# Patient Record
Sex: MALE | Race: WHITE | NOT HISPANIC OR LATINO | Employment: FULL TIME | URBAN - METROPOLITAN AREA
[De-identification: names, ages, dates, MRNs, and addresses within clinical notes are randomized per-mention and may not be internally consistent; named-entity substitution may affect disease eponyms.]

---

## 2017-07-31 ENCOUNTER — APPOINTMENT (EMERGENCY)
Dept: RADIOLOGY | Facility: HOSPITAL | Age: 27
End: 2017-07-31
Payer: COMMERCIAL

## 2017-07-31 ENCOUNTER — HOSPITAL ENCOUNTER (EMERGENCY)
Facility: HOSPITAL | Age: 27
Discharge: HOME | End: 2017-08-01
Attending: EMERGENCY MEDICINE
Payer: COMMERCIAL

## 2017-07-31 VITALS
OXYGEN SATURATION: 98 % | HEART RATE: 100 BPM | DIASTOLIC BLOOD PRESSURE: 84 MMHG | BODY MASS INDEX: 19.23 KG/M2 | TEMPERATURE: 98.1 F | RESPIRATION RATE: 18 BRPM | SYSTOLIC BLOOD PRESSURE: 135 MMHG | WEIGHT: 142 LBS | HEIGHT: 72 IN

## 2017-07-31 DIAGNOSIS — S81.012A LACERATION OF SKIN OF LEFT KNEE, INITIAL ENCOUNTER: Primary | ICD-10-CM

## 2017-07-31 PROCEDURE — 73560 X-RAY EXAM OF KNEE 1 OR 2: CPT

## 2017-07-31 RX ORDER — LIDOCAINE HYDROCHLORIDE AND EPINEPHRINE 10; 10 MG/ML; UG/ML
10 INJECTION, SOLUTION INFILTRATION; PERINEURAL ONCE
Status: DISCONTINUED | OUTPATIENT
Start: 2017-07-31 | End: 2017-08-01 | Stop reason: HOSPADM

## 2017-08-01 PROCEDURE — 99283 EMERGENCY DEPT VISIT LOW MDM: CPT

## 2021-05-29 ENCOUNTER — OFFICE VISIT (OUTPATIENT)
Dept: URGENT CARE | Facility: CLINIC | Age: 31
End: 2021-05-29
Payer: COMMERCIAL

## 2021-05-29 VITALS
BODY MASS INDEX: 20.75 KG/M2 | SYSTOLIC BLOOD PRESSURE: 154 MMHG | OXYGEN SATURATION: 99 % | RESPIRATION RATE: 20 BRPM | WEIGHT: 153 LBS | HEART RATE: 69 BPM | TEMPERATURE: 98 F | DIASTOLIC BLOOD PRESSURE: 78 MMHG

## 2021-05-29 DIAGNOSIS — R07.0 THROAT PAIN: Primary | ICD-10-CM

## 2021-05-29 PROCEDURE — G0382 LEV 3 HOSP TYPE B ED VISIT: HCPCS | Performed by: PHYSICIAN ASSISTANT

## 2021-05-29 PROCEDURE — S9083 URGENT CARE CENTER GLOBAL: HCPCS | Performed by: PHYSICIAN ASSISTANT

## 2021-05-29 RX ORDER — FAMOTIDINE 20 MG/1
20 TABLET, FILM COATED ORAL 2 TIMES DAILY
Qty: 60 TABLET | Refills: 0 | Status: SHIPPED | OUTPATIENT
Start: 2021-05-29

## 2021-05-29 NOTE — PROGRESS NOTES
[address]        NAME: Vic Medrano is a 36 y o  male  : 1990    MRN: [id number]  DATE: May 29, 2021  TIME: 2:16 PM    Assessment and Plan   Throat pain [R07 0]  1  Throat pain  Ambulatory Referral to Otolaryngology    famotidine (PEPCID) 20 mg tablet         Patient Instructions     Patient Instructions     Possible reflux with some irritation in the throat  No lymph nodes appreciated or thyroid   Mass  Can start Christina reflux medicines such as famotidine  May benefit from ENT evaluation  Discussed tobacco cessation  Follow up with PCP in 3-5 days  Proceed to  ER if symptoms worsen  Chief Complaint     Chief Complaint   Patient presents with    Neck Pain     both sides no injury , no OTC meds taken x few days         History of Present Illness         82-year-old male complains of throat and anterior neck pain for last several days  It is progressing and now has pain more throughout the day  Started after eating and when lying down at night  No regurgitation but does get some heartburn and reflux  No painful swallowing or trouble swallowing  No cough or trouble breathing  He does use smokeless tobacco about half can per day  No fever  Review of Systems   Review of Systems   Constitutional: Negative  HENT: Negative  Respiratory: Negative  Cardiovascular: Negative  Gastrointestinal: Negative  Musculoskeletal: Positive for neck pain  Negative for neck stiffness           Current Medications       Current Outpatient Medications:     famotidine (PEPCID) 20 mg tablet, Take 1 tablet (20 mg total) by mouth 2 (two) times a day, Disp: 60 tablet, Rfl: 0    Current Allergies     Allergies as of 2021    (No Known Allergies)            The following portions of the patient's history were reviewed and updated as appropriate: allergies, current medications, past family history, past medical history, past social history, past surgical history and problem list  Past Medical History:   Diagnosis Date    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[address] )     Renal disorder        Past Surgical History:   Procedure Laterality Date    KIDNEY SURGERY      SHOULDER SURGERY         History reviewed  No pertinent family history  Medications have been verified  Objective   /78   Pulse 69   Temp 98 °F (36 7 °C)   Resp 20   Wt 69 4 kg (153 lb)   SpO2 99%   BMI 20 75 kg/m²        Physical Exam     Physical Exam  Constitutional:       General: He is not in acute distress  Appearance: He is well-developed  HENT:      Head: Normocephalic and atraumatic  Right Ear: Tympanic membrane, ear canal and external ear normal  No middle ear effusion  Tympanic membrane is not erythematous, retracted or bulging  Left Ear: Tympanic membrane, ear canal and external ear normal   No middle ear effusion  Tympanic membrane is not erythematous, retracted or bulging  Nose: Nose normal  No mucosal edema or rhinorrhea  Right Sinus: No maxillary sinus tenderness or frontal sinus tenderness  Left Sinus: No maxillary sinus tenderness or frontal sinus tenderness  Mouth/Throat:      Pharynx: No posterior oropharyngeal erythema  Eyes:      General:         Right eye: No discharge  Left eye: No discharge  Conjunctiva/sclera: Conjunctivae normal       Right eye: Right conjunctiva is not injected  No chemosis  Left eye: Left conjunctiva is not injected  No chemosis  Pupils: Pupils are equal, round, and reactive to light  Neck:      Musculoskeletal: Normal range of motion and neck supple  Thyroid: No thyroid mass  Cardiovascular:      Rate and Rhythm: Normal rate and regular rhythm  Heart sounds: Normal heart sounds  Pulmonary:      Effort: Pulmonary effort is normal  No respiratory distress  Breath sounds: Normal breath sounds  No wheezing or rales  Abdominal:      General: Abdomen is flat   Bowel sounds are normal       Palpations: Abdomen is soft  Tenderness: There is no abdominal tenderness  There is no right CVA tenderness, left CVA tenderness, guarding or rebound  Negative signs include Warner's sign  Musculoskeletal:      Comments:   C-spine full range of motion  Some pain with extension  Nontender over the bilateral sternocleidomastoid  Nontender over bilateral sternoclavicular joints  Lymphadenopathy:      Cervical: No cervical adenopathy  Right cervical: No superficial cervical adenopathy  Left cervical: No superficial cervical adenopathy  Skin:     General: Skin is warm and dry  Findings: No rash  Neurological:      Mental Status: He is alert and oriented to person, place, and time  Cranial Nerves: No cranial nerve deficit

## 2021-05-29 NOTE — PATIENT INSTRUCTIONS
Possible reflux with some irritation in the throat  No lymph nodes appreciated or thyroid   Mass  Can start Christina reflux medicines such as famotidine  May benefit from ENT evaluation  Discussed tobacco cessation

## 2021-11-23 ENCOUNTER — HOSPITAL ENCOUNTER (EMERGENCY)
Facility: HOSPITAL | Age: 31
Discharge: HOME | End: 2021-11-23
Attending: EMERGENCY MEDICINE | Admitting: EMERGENCY MEDICINE
Payer: COMMERCIAL

## 2021-11-23 ENCOUNTER — APPOINTMENT (EMERGENCY)
Dept: ULTRASOUND IMAGING | Facility: HOSPITAL | Age: 31
End: 2021-11-23
Payer: COMMERCIAL

## 2021-11-23 VITALS
TEMPERATURE: 97.9 F | HEART RATE: 112 BPM | DIASTOLIC BLOOD PRESSURE: 92 MMHG | RESPIRATION RATE: 18 BRPM | OXYGEN SATURATION: 95 % | SYSTOLIC BLOOD PRESSURE: 136 MMHG

## 2021-11-23 DIAGNOSIS — N43.3 LEFT HYDROCELE: Primary | ICD-10-CM

## 2021-11-23 LAB
BILIRUB UR QL STRIP: NEGATIVE
CLARITY UR: CLEAR
COLOR UR: YELLOW
GLUCOSE UR STRIP-MCNC: NEGATIVE MG/DL
HGB UR QL STRIP.AUTO: NEGATIVE
KETONES UR STRIP-MCNC: ABNORMAL MG/DL
LEUKOCYTE ESTERASE UR QL STRIP: NEGATIVE
NITRITE UR QL STRIP: NEGATIVE
PH UR STRIP.AUTO: 6 [PH]
PROT UR STRIP-MCNC: NEGATIVE MG/DL
SP GR UR STRIP.AUTO: 1.02 (ref 1–1.03)
UROBILINOGEN UR QL STRIP.AUTO: 0.2 E.U./DL

## 2021-11-23 PROCEDURE — 99284 EMERGENCY DEPT VISIT MOD MDM: CPT

## 2021-11-23 PROCEDURE — 76870 US EXAM SCROTUM: CPT

## 2021-11-23 PROCEDURE — 81003 URINALYSIS AUTO W/O SCOPE: CPT | Performed by: EMERGENCY MEDICINE

## 2021-11-23 PROCEDURE — 99282 EMERGENCY DEPT VISIT SF MDM: CPT | Performed by: EMERGENCY MEDICINE

## 2021-11-24 ENCOUNTER — TELEPHONE (OUTPATIENT)
Dept: UROLOGY | Facility: AMBULATORY SURGERY CENTER | Age: 31
End: 2021-11-24

## 2021-11-30 ENCOUNTER — OFFICE VISIT (OUTPATIENT)
Dept: UROLOGY | Facility: CLINIC | Age: 31
End: 2021-11-30
Payer: COMMERCIAL

## 2021-11-30 VITALS — HEIGHT: 72 IN | BODY MASS INDEX: 20.99 KG/M2 | HEART RATE: 94 BPM | WEIGHT: 155 LBS

## 2021-11-30 DIAGNOSIS — D30.02: ICD-10-CM

## 2021-11-30 DIAGNOSIS — C64.2 MALIGNANT NEOPLASM OF LEFT KIDNEY (HCC): Primary | ICD-10-CM

## 2021-11-30 PROCEDURE — 99203 OFFICE O/P NEW LOW 30 MIN: CPT | Performed by: PHYSICIAN ASSISTANT

## 2021-12-02 ENCOUNTER — APPOINTMENT (OUTPATIENT)
Dept: LAB | Facility: MEDICAL CENTER | Age: 31
End: 2021-12-02
Payer: COMMERCIAL

## 2021-12-02 DIAGNOSIS — C64.2 MALIGNANT NEOPLASM OF LEFT KIDNEY (HCC): ICD-10-CM

## 2021-12-02 LAB
AFP-TM SERPL-MCNC: 4.5 NG/ML (ref 0.5–8)
ALBUMIN SERPL BCP-MCNC: 4.1 G/DL (ref 3.5–5)
ALP SERPL-CCNC: 78 U/L (ref 46–116)
ALT SERPL W P-5'-P-CCNC: 36 U/L (ref 12–78)
ANION GAP SERPL CALCULATED.3IONS-SCNC: 7 MMOL/L (ref 4–13)
AST SERPL W P-5'-P-CCNC: 18 U/L (ref 5–45)
BILIRUB SERPL-MCNC: 0.82 MG/DL (ref 0.2–1)
BUN SERPL-MCNC: 11 MG/DL (ref 5–25)
CALCIUM SERPL-MCNC: 10.1 MG/DL (ref 8.3–10.1)
CHLORIDE SERPL-SCNC: 107 MMOL/L (ref 100–108)
CO2 SERPL-SCNC: 24 MMOL/L (ref 21–32)
CREAT SERPL-MCNC: 1.26 MG/DL (ref 0.6–1.3)
ERYTHROCYTE [DISTWIDTH] IN BLOOD BY AUTOMATED COUNT: 12.6 % (ref 11.6–15.1)
GFR SERPL CREATININE-BSD FRML MDRD: 77 ML/MIN/1.73SQ M
GLUCOSE SERPL-MCNC: 94 MG/DL (ref 65–140)
HCG-TM SERPL-SCNC: <2 MLU/ML
HCT VFR BLD AUTO: 44.5 % (ref 36.5–49.3)
HGB BLD-MCNC: 15.8 G/DL (ref 12–17)
MCH RBC QN AUTO: 31.8 PG (ref 26.8–34.3)
MCHC RBC AUTO-ENTMCNC: 35.5 G/DL (ref 31.4–37.4)
MCV RBC AUTO: 90 FL (ref 82–98)
PLATELET # BLD AUTO: 304 THOUSANDS/UL (ref 149–390)
PMV BLD AUTO: 10 FL (ref 8.9–12.7)
POTASSIUM SERPL-SCNC: 3.5 MMOL/L (ref 3.5–5.3)
PROT SERPL-MCNC: 7.8 G/DL (ref 6.4–8.2)
RBC # BLD AUTO: 4.97 MILLION/UL (ref 3.88–5.62)
SODIUM SERPL-SCNC: 138 MMOL/L (ref 136–145)
WBC # BLD AUTO: 9.56 THOUSAND/UL (ref 4.31–10.16)

## 2021-12-02 PROCEDURE — 83625 ASSAY OF LDH ENZYMES: CPT

## 2021-12-02 PROCEDURE — 82105 ALPHA-FETOPROTEIN SERUM: CPT

## 2021-12-02 PROCEDURE — 84702 CHORIONIC GONADOTROPIN TEST: CPT

## 2021-12-02 PROCEDURE — 83615 LACTATE (LD) (LDH) ENZYME: CPT

## 2021-12-02 PROCEDURE — 36415 COLL VENOUS BLD VENIPUNCTURE: CPT

## 2021-12-02 PROCEDURE — 80053 COMPREHEN METABOLIC PANEL: CPT

## 2021-12-02 PROCEDURE — 85027 COMPLETE CBC AUTOMATED: CPT

## 2021-12-06 LAB
LDH SERPL-CCNC: 142 IU/L (ref 121–224)
LDH1 CFR SERPL ELPH: 30 % (ref 17–32)
LDH2 CFR SERPL ELPH: 33 % (ref 25–40)
LDH3 CFR SERPL ELPH: 19 % (ref 17–27)
LDH4 CFR SERPL ELPH: 8 % (ref 5–13)
LDH5 CFR SERPL ELPH: 10 % (ref 4–20)

## 2021-12-22 ENCOUNTER — TELEPHONE (OUTPATIENT)
Dept: UROLOGY | Facility: AMBULATORY SURGERY CENTER | Age: 31
End: 2021-12-22

## 2021-12-22 DIAGNOSIS — C64.2 MALIGNANT NEOPLASM OF LEFT KIDNEY (HCC): Primary | ICD-10-CM

## 2021-12-22 NOTE — TELEPHONE ENCOUNTER
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

## 2021-12-22 NOTE — TELEPHONE ENCOUNTER
Please see message below for new orders please call patient and schedule and cancel his CT scan which is not approved    Thanks  Poornima Pearson

## 2021-12-22 NOTE — TELEPHONE ENCOUNTER
LMOM advising of previous notes  Provided patient with CS contact number, also mailed patient both US & XR orders

## 2021-12-22 NOTE — TELEPHONE ENCOUNTER
It appears Christian already ordered on ultrasound and a chest x-ray if you could please assist in scheduling for this patient  The CT scan chest abdomen pelvis was denied by the insurance

## 2021-12-22 NOTE — TELEPHONE ENCOUNTER
A peer to peer was already done with Miki Keith please see message below  Let [facility] know    Thanks  Aryan Faria

## 2021-12-22 NOTE — TELEPHONE ENCOUNTER
Ken Burk  This patients CT scans are denied with the insurance  Please call [phone]   To try and do a peer to peer case# [id number]  Let me know the outcome    Thanks  Judd Daniel

## 2022-01-04 ENCOUNTER — TELEPHONE (OUTPATIENT)
Dept: UROLOGY | Facility: CLINIC | Age: 32
End: 2022-01-04

## 2022-01-04 NOTE — TELEPHONE ENCOUNTER
----- Message from Keyonna Downing PA-C sent at 1/4/2022  4:04 PM EST -----   Does not appear he has had any of his testing so maybe he can be rescheduled until that is completed

## 2022-01-04 NOTE — TELEPHONE ENCOUNTER
Spoke with patient to advise him we will need to reschedule his appt due to imaging was not performed  Patient states his insurance denied CT scan  An order for US and chest xray were placed instead, but according to patient, insurance denied those tests also  Patient did have labs performed and would like to reschedule as he is still having same issues  Patient also reports he was going to have to cancel his appt 1/6/22 anyway as he tested positive for COVID yesterday  As of right now, rescheduled his appt for 1/11/22, and advised patient if he is still symptomatic at that time, he will need to reschedule  Patient verbalized understanding and agrees to plan

## 2022-02-28 ENCOUNTER — HOSPITAL ENCOUNTER (EMERGENCY)
Facility: HOSPITAL | Age: 32
Discharge: HOME | End: 2022-02-28
Attending: EMERGENCY MEDICINE | Admitting: EMERGENCY MEDICINE
Payer: COMMERCIAL

## 2022-02-28 ENCOUNTER — APPOINTMENT (EMERGENCY)
Dept: CT IMAGING | Facility: HOSPITAL | Age: 32
End: 2022-02-28
Payer: COMMERCIAL

## 2022-02-28 VITALS
OXYGEN SATURATION: 100 % | HEART RATE: 83 BPM | SYSTOLIC BLOOD PRESSURE: 152 MMHG | DIASTOLIC BLOOD PRESSURE: 76 MMHG | RESPIRATION RATE: 18 BRPM | TEMPERATURE: 97.7 F

## 2022-02-28 DIAGNOSIS — T14.8XXA CONTUSION: Primary | ICD-10-CM

## 2022-02-28 LAB
ALBUMIN SERPL BCP-MCNC: 3.7 G/DL (ref 3.5–5)
ALP SERPL-CCNC: 80 U/L (ref 46–116)
ALT SERPL W P-5'-P-CCNC: 47 U/L (ref 12–78)
ANION GAP SERPL CALCULATED.3IONS-SCNC: 10 MMOL/L (ref 4–13)
AST SERPL W P-5'-P-CCNC: 32 U/L (ref 5–45)
BASOPHILS # BLD AUTO: 0.03 THOUSANDS/ΜL (ref 0–0.1)
BASOPHILS NFR BLD AUTO: 0 % (ref 0–1)
BILIRUB SERPL-MCNC: 0.79 MG/DL (ref 0.2–1)
BILIRUB UR QL STRIP: NEGATIVE
BUN SERPL-MCNC: 7 MG/DL (ref 5–25)
CALCIUM SERPL-MCNC: 9.3 MG/DL (ref 8.3–10.1)
CHLORIDE SERPL-SCNC: 102 MMOL/L (ref 100–108)
CLARITY UR: CLEAR
CO2 SERPL-SCNC: 26 MMOL/L (ref 21–32)
COLOR UR: YELLOW
CREAT SERPL-MCNC: 1.03 MG/DL (ref 0.6–1.3)
EOSINOPHIL # BLD AUTO: 0.05 THOUSAND/ΜL (ref 0–0.61)
EOSINOPHIL NFR BLD AUTO: 1 % (ref 0–6)
ERYTHROCYTE [DISTWIDTH] IN BLOOD BY AUTOMATED COUNT: 12.3 % (ref 11.6–15.1)
GFR SERPL CREATININE-BSD FRML MDRD: 97 ML/MIN/1.73SQ M
GLUCOSE SERPL-MCNC: 104 MG/DL (ref 65–140)
GLUCOSE UR STRIP-MCNC: NEGATIVE MG/DL
HCT VFR BLD AUTO: 43.2 % (ref 36.5–49.3)
HGB BLD-MCNC: 15.9 G/DL (ref 12–17)
HGB UR QL STRIP.AUTO: NEGATIVE
IMM GRANULOCYTES # BLD AUTO: 0.02 THOUSAND/UL (ref 0–0.2)
IMM GRANULOCYTES NFR BLD AUTO: 0 % (ref 0–2)
KETONES UR STRIP-MCNC: NEGATIVE MG/DL
LEUKOCYTE ESTERASE UR QL STRIP: NEGATIVE
LIPASE SERPL-CCNC: 120 U/L (ref 73–393)
LYMPHOCYTES # BLD AUTO: 1.32 THOUSANDS/ΜL (ref 0.6–4.47)
LYMPHOCYTES NFR BLD AUTO: 13 % (ref 14–44)
MCH RBC QN AUTO: 32.9 PG (ref 26.8–34.3)
MCHC RBC AUTO-ENTMCNC: 36.8 G/DL (ref 31.4–37.4)
MCV RBC AUTO: 89 FL (ref 82–98)
MONOCYTES # BLD AUTO: 0.98 THOUSAND/ΜL (ref 0.17–1.22)
MONOCYTES NFR BLD AUTO: 9 % (ref 4–12)
NEUTROPHILS # BLD AUTO: 8.03 THOUSANDS/ΜL (ref 1.85–7.62)
NEUTS SEG NFR BLD AUTO: 77 % (ref 43–75)
NITRITE UR QL STRIP: NEGATIVE
NRBC BLD AUTO-RTO: 0 /100 WBCS
PH UR STRIP.AUTO: 6 [PH] (ref 4.5–8)
PLATELET # BLD AUTO: 318 THOUSANDS/UL (ref 149–390)
PMV BLD AUTO: 9.1 FL (ref 8.9–12.7)
POTASSIUM SERPL-SCNC: 4 MMOL/L (ref 3.5–5.3)
PROT SERPL-MCNC: 7.3 G/DL (ref 6.4–8.2)
PROT UR STRIP-MCNC: NEGATIVE MG/DL
RBC # BLD AUTO: 4.83 MILLION/UL (ref 3.88–5.62)
SODIUM SERPL-SCNC: 138 MMOL/L (ref 136–145)
SP GR UR STRIP.AUTO: 1.01 (ref 1–1.03)
UROBILINOGEN UR QL STRIP.AUTO: 0.2 E.U./DL
WBC # BLD AUTO: 10.43 THOUSAND/UL (ref 4.31–10.16)

## 2022-02-28 PROCEDURE — 36415 COLL VENOUS BLD VENIPUNCTURE: CPT | Performed by: PHYSICIAN ASSISTANT

## 2022-02-28 PROCEDURE — 85025 COMPLETE CBC W/AUTO DIFF WBC: CPT | Performed by: PHYSICIAN ASSISTANT

## 2022-02-28 PROCEDURE — 80053 COMPREHEN METABOLIC PANEL: CPT | Performed by: PHYSICIAN ASSISTANT

## 2022-02-28 PROCEDURE — 83690 ASSAY OF LIPASE: CPT | Performed by: PHYSICIAN ASSISTANT

## 2022-02-28 PROCEDURE — G1004 CDSM NDSC: HCPCS

## 2022-02-28 PROCEDURE — 99284 EMERGENCY DEPT VISIT MOD MDM: CPT | Performed by: PHYSICIAN ASSISTANT

## 2022-02-28 PROCEDURE — 74177 CT ABD & PELVIS W/CONTRAST: CPT

## 2022-02-28 PROCEDURE — 99284 EMERGENCY DEPT VISIT MOD MDM: CPT

## 2022-02-28 PROCEDURE — 81003 URINALYSIS AUTO W/O SCOPE: CPT

## 2022-02-28 RX ADMIN — IOHEXOL 100 ML: 350 INJECTION, SOLUTION INTRAVENOUS at 10:27

## 2022-02-28 NOTE — ED PROVIDER NOTES
History  Chief Complaint   Patient presents with    Flank Pain     Pt reports woke up this AM with bruising to left flank  No injury  Pt having pain to left flank  pt denies n/v/d  Pt denies shortness of breath  The patient is a 51-year-old male with history of germ cell tumors who presents to the emergency department for evaluation of left-sided flank pain and bruising  The patient states he woke this morning, and he noted some discomfort in his left flank/left lateral abdomen area  He states he looked in the mirror, and he noted a large bruise to the area  He adamantly denies any trauma  He states he does not believe the bruise with there last night before he went to sleep  He reports tenderness he really over the area of the bruising  He denies any other abdominal or back pain  He did not take anything for pain today  There is no associated nausea, vomiting or diarrhea  He additionally denies fever, chills, chest pain or shortness of breath  History provided by:  Patient   used: No    Flank Pain  Associated symptoms: no chest pain, no chills, no cough, no diarrhea, no dysuria, no fever, no hematuria, no nausea, no shortness of breath, no sore throat and no vomiting        Prior to Admission Medications   Prescriptions Last Dose Informant Patient Reported? Taking?   famotidine (PEPCID) 20 mg tablet  Self No No   Sig: Take 1 tablet (20 mg total) by mouth 2 (two) times a day   omeprazole (PriLOSEC) 20 mg delayed release capsule  Self No No   Sig: Take 1 capsule (20 mg total) by mouth daily      Facility-Administered Medications: None       Past Medical History:   Diagnosis Date    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 [address] )     Renal disorder        Past Surgical History:   Procedure Laterality Date    KIDNEY SURGERY      SHOULDER SURGERY         History reviewed  No pertinent family history  I have reviewed and agree with the history as documented      E-Cigarette/Vaping    E-Cigarette Use Never User      E-Cigarette/Vaping Substances     Social History     Tobacco Use    Smoking status: Never Smoker    Smokeless tobacco: Current User     Types: Chew   Vaping Use    Vaping Use: Never used   Substance Use Topics    Alcohol use: Yes    Drug use: No       Review of Systems   Constitutional: Negative for chills and fever  HENT: Negative for ear pain and sore throat  Eyes: Negative for redness and visual disturbance  Respiratory: Negative for cough, shortness of breath and wheezing  Cardiovascular: Negative for chest pain  Gastrointestinal: Negative for abdominal pain, diarrhea, nausea and vomiting  Genitourinary: Positive for flank pain  Negative for dysuria and hematuria  Musculoskeletal: Negative for back pain, neck pain and neck stiffness  Skin: Positive for color change (Bruising)  Negative for rash  Neurological: Negative for dizziness, light-headedness and headaches  All other systems reviewed and are negative  Physical Exam  Physical Exam  Vitals and nursing note reviewed  Constitutional:       General: He is not in acute distress  Appearance: He is well-developed  He is not ill-appearing or toxic-appearing  HENT:      Head: Normocephalic and atraumatic  Eyes:      Pupils: Pupils are equal, round, and reactive to light  Cardiovascular:      Rate and Rhythm: Normal rate and regular rhythm  Heart sounds: Normal heart sounds  Pulmonary:      Effort: Pulmonary effort is normal       Breath sounds: Normal breath sounds  Abdominal:      General: There is no distension  Palpations: Abdomen is soft  Tenderness: There is no guarding or rebound  Musculoskeletal:      Cervical back: Normal range of motion and neck supple  Skin:     General: Skin is warm and dry  Neurological:      Mental Status: He is alert and oriented to person, place, and time           Vital Signs  ED Triage Vitals [02/28/22 0851]   Temperature Pulse Respirations Blood Pressure SpO2   97 7 °F (36 5 °C) 95 18 (!) 182/99 98 %      Temp Source Heart Rate Source Patient Position - Orthostatic VS BP Location FiO2 (%)   Oral Monitor Lying Right arm --      Pain Score       7           Vitals:    02/28/22 0851 02/28/22 1103   BP: (!) 182/99 152/76   Pulse: 95 83   Patient Position - Orthostatic VS: Lying Sitting         Visual Acuity      ED Medications  Medications   iohexol (OMNIPAQUE) 350 MG/ML injection (SINGLE-DOSE) 100 mL (100 mL Intravenous Given 2/28/22 1027)       Diagnostic Studies  Results Reviewed     Procedure Component Value Units Date/Time    Lipase [[id number]]  (Normal) Collected: 02/28/22 0919    Lab Status: Final result Specimen: Blood from Arm, Right Updated: 02/28/22 1001     Lipase 120 u/L     Comprehensive metabolic panel [[id number]] Collected: 02/28/22 0919    Lab Status: Final result Specimen: Blood from Arm, Right Updated: 02/28/22 1001     Sodium 138 mmol/L      Potassium 4 0 mmol/L      Chloride 102 mmol/L      CO2 26 mmol/L      ANION GAP 10 mmol/L      BUN 7 mg/dL      Creatinine 1 03 mg/dL      Glucose 104 mg/dL      Calcium 9 3 mg/dL      AST 32 U/L      ALT 47 U/L      Alkaline Phosphatase 80 U/L      Total Protein 7 3 g/dL      Albumin 3 7 g/dL      Total Bilirubin 0 79 mg/dL      eGFR 97 ml/min/1 73sq m     Narrative:      [place] guidelines for Chronic Kidney Disease (CKD):     Stage 1 with normal or high GFR (GFR > 90 mL/min/1 73 square meters)    Stage 2 Mild CKD (GFR = 60-89 mL/min/1 73 square meters)    Stage 3A Moderate CKD (GFR = 45-59 mL/min/1 73 square meters)    Stage 3B Moderate CKD (GFR = 30-44 mL/min/1 73 square meters)    Stage 4 Severe CKD (GFR = 15-29 mL/min/1 73 square meters)    Stage 5 End Stage CKD (GFR <15 mL/min/1 73 square meters)  Note: GFR calculation is accurate only with a steady state creatinine    CBC and differential [[id number]]  (Abnormal) Collected: 02/28/22 0919    Lab Status: Final result Specimen: Blood from Arm, Right Updated: 02/28/22 0938     WBC 10 43 Thousand/uL      RBC 4 83 Million/uL      Hemoglobin 15 9 g/dL      Hematocrit 43 2 %      MCV 89 fL      MCH 32 9 pg      MCHC 36 8 g/dL      RDW 12 3 %      MPV 9 1 fL      Platelets 645 Thousands/uL      nRBC 0 /100 WBCs      Neutrophils Relative 77 %      Immat GRANS % 0 %      Lymphocytes Relative 13 %      Monocytes Relative 9 %      Eosinophils Relative 1 %      Basophils Relative 0 %      Neutrophils Absolute 8 03 Thousands/µL      Immature Grans Absolute 0 02 Thousand/uL      Lymphocytes Absolute 1 32 Thousands/µL      Monocytes Absolute 0 98 Thousand/µL      Eosinophils Absolute 0 05 Thousand/µL      Basophils Absolute 0 03 Thousands/µL     Urine Macroscopic, POC [[id number]] Collected: 02/28/22 7335    Lab Status: Final result Specimen: Urine Updated: 02/28/22 0924     Color, UA Yellow     Clarity, UA Clear     pH, UA 6 0     Leukocytes, UA Negative     Nitrite, UA Negative     Protein, UA Negative mg/dl      Glucose, UA Negative mg/dl      Ketones, UA Negative mg/dl      Urobilinogen, UA 0 2 E U /dl      Bilirubin, UA Negative     Blood, UA Negative     Specific Gravity, UA 1 015    Narrative:      [facility] RESULT                 CT abdomen pelvis with contrast   Final Result by Virginia Desai MD (02/28 1101)      Left lateral body wall contusion  No traumatic visceral injury in the abdomen or pelvis  Expected surgical changes of left partial nephrectomy and retroperitoneal lymph node dissection  No evidence for recurrent or metastatic tumor in the abdomen or pelvis  Hydrocele in the left hemiscrotum, incompletely evaluated  Workstation performed: [id number]                    Procedures  Procedures         ED Course  ED Course as of 02/28/22 1604   Mon Feb 28, 2022   1035 Patient is resting comfortably in continues to deny anything for pain  All labs reviewed with no significant findings  Pending CT results  MDM  Number of Diagnoses or Management Options  Contusion: new and requires workup  Diagnosis management comments: Patient presents for evaluation of left-sided flank pain and bruising that started this morning  Patient denies any injury  Differential includes but is not limited to contusion versus hematoma versus grey hathaway's sign    Tenderness is concentrated over area of bruising  However, as the bruising is reportedly atraumatic, will do further workup at this time  Patient is agreeable  Labs and imaging ordered  Patient was offered something for pain, but he declined  Labs reviewed with no significant findings  Imaging shows contusion of subcutaneous tissue but no other indication of intra-abdominal injury  At this time, I do not have an etiology for bruising, although I suspect there was trauma that the patient was unaware of  I advised that he keep an eye on the area, and that he get re-evaluated if area of ecchymosis spreads or he starts having other frequent bruising or pain  He acknowledged understanding  I recommended follow-up with his primary care doctor  Patient is stable for discharge         Amount and/or Complexity of Data Reviewed  Clinical lab tests: ordered and reviewed  Tests in the radiology section of CPT®: ordered and reviewed  Decide to obtain previous medical records or to obtain history from someone other than the patient: yes  Review and summarize past medical records: yes    Risk of Complications, Morbidity, and/or Mortality  Presenting problems: low  Diagnostic procedures: low  Management options: low    Patient Progress  Patient progress: stable      Disposition  Final diagnoses:   Contusion     Time reflects when diagnosis was documented in both MDM as applicable and the Disposition within this note     Time User Action Codes Description Comment    2/28/2022 11:24 AM Radha Stephenson [T14 8XXA] Contusion       ED Disposition     ED Disposition Condition Date/Time Comment    Discharge Stable Mon Feb 28, 2022 11:24 AM Daniel Nolasco discharge to home/self care  Follow-up Information     Follow up With Specialties Details Why Contact Info Additional Information    [facility] Schedule an appointment as soon as possible for a visit   [address] [place] [address] [address] [id number] [address] [facility] [address] Emergency Department Emergency Medicine  If symptoms worsen [address] Emergency Department, [address], [place], [place], [address]          Discharge Medication List as of 2/28/2022 11:25 AM      CONTINUE these medications which have NOT CHANGED    Details   famotidine (PEPCID) 20 mg tablet Take 1 tablet (20 mg total) by mouth 2 (two) times a day, Starting Sat 5/29/2021, Normal      omeprazole (PriLOSEC) 20 mg delayed release capsule Take 1 capsule (20 mg total) by mouth daily, Starting Fri 6/4/2021, Normal             No discharge procedures on file      PDMP Review     None          ED Provider  Electronically Signed by           Yovany Tolbert PA-C  02/28/22 8912

## 2022-05-08 ENCOUNTER — HOSPITAL ENCOUNTER (EMERGENCY)
Facility: HOSPITAL | Age: 32
Discharge: HOME | End: 2022-05-08
Attending: EMERGENCY MEDICINE
Payer: COMMERCIAL

## 2022-05-08 VITALS
DIASTOLIC BLOOD PRESSURE: 76 MMHG | HEART RATE: 116 BPM | OXYGEN SATURATION: 95 % | SYSTOLIC BLOOD PRESSURE: 136 MMHG | TEMPERATURE: 98.2 F | RESPIRATION RATE: 20 BRPM

## 2022-05-08 DIAGNOSIS — F10.129 ALCOHOL INTOXICATION IN ACTIVE ALCOHOLIC (HCC): Primary | ICD-10-CM

## 2022-05-08 DIAGNOSIS — I45.6 WPW (WOLFF-PARKINSON-WHITE SYNDROME): ICD-10-CM

## 2022-05-08 LAB
ANION GAP SERPL CALCULATED.3IONS-SCNC: 9 MMOL/L (ref 4–13)
ATRIAL RATE: 96 BPM
BASOPHILS # BLD AUTO: 0.03 THOUSANDS/ΜL (ref 0–0.1)
BASOPHILS NFR BLD AUTO: 0 % (ref 0–1)
BUN SERPL-MCNC: 8 MG/DL (ref 5–25)
CALCIUM SERPL-MCNC: 8.4 MG/DL (ref 8.3–10.1)
CHLORIDE SERPL-SCNC: 102 MMOL/L (ref 100–108)
CO2 SERPL-SCNC: 25 MMOL/L (ref 21–32)
CREAT SERPL-MCNC: 0.93 MG/DL (ref 0.6–1.3)
EOSINOPHIL # BLD AUTO: 0.02 THOUSAND/ΜL (ref 0–0.61)
EOSINOPHIL NFR BLD AUTO: 0 % (ref 0–6)
ERYTHROCYTE [DISTWIDTH] IN BLOOD BY AUTOMATED COUNT: 11.9 % (ref 11.6–15.1)
ETHANOL EXG-MCNC: 0.17 MG/DL
ETHANOL EXG-MCNC: 0.21 MG/DL
GFR SERPL CREATININE-BSD FRML MDRD: 110 ML/MIN/1.73SQ M
GLUCOSE SERPL-MCNC: 88 MG/DL (ref 65–140)
HCT VFR BLD AUTO: 47.9 % (ref 36.5–49.3)
HGB BLD-MCNC: 16.7 G/DL (ref 12–17)
IMM GRANULOCYTES # BLD AUTO: 0.04 THOUSAND/UL (ref 0–0.2)
IMM GRANULOCYTES NFR BLD AUTO: 0 % (ref 0–2)
LYMPHOCYTES # BLD AUTO: 1.5 THOUSANDS/ΜL (ref 0.6–4.47)
LYMPHOCYTES NFR BLD AUTO: 16 % (ref 14–44)
MAGNESIUM SERPL-MCNC: 2.6 MG/DL (ref 1.6–2.6)
MCH RBC QN AUTO: 32.2 PG (ref 26.8–34.3)
MCHC RBC AUTO-ENTMCNC: 34.9 G/DL (ref 31.4–37.4)
MCV RBC AUTO: 93 FL (ref 82–98)
MONOCYTES # BLD AUTO: 0.59 THOUSAND/ΜL (ref 0.17–1.22)
MONOCYTES NFR BLD AUTO: 6 % (ref 4–12)
NEUTROPHILS # BLD AUTO: 7.49 THOUSANDS/ΜL (ref 1.85–7.62)
NEUTS SEG NFR BLD AUTO: 78 % (ref 43–75)
NRBC BLD AUTO-RTO: 0 /100 WBCS
P AXIS: 75 DEGREES
PLATELET # BLD AUTO: 313 THOUSANDS/UL (ref 149–390)
PMV BLD AUTO: 8.6 FL (ref 8.9–12.7)
POTASSIUM SERPL-SCNC: 4.2 MMOL/L (ref 3.5–5.3)
POTASSIUM SERPL-SCNC: 5.6 MMOL/L (ref 3.5–5.3)
PR INTERVAL: 112 MS
QRS AXIS: 101 DEGREES
QRSD INTERVAL: 158 MS
QT INTERVAL: 398 MS
QTC INTERVAL: 502 MS
RBC # BLD AUTO: 5.18 MILLION/UL (ref 3.88–5.62)
SODIUM SERPL-SCNC: 136 MMOL/L (ref 136–145)
T WAVE AXIS: 60 DEGREES
VENTRICULAR RATE: 96 BPM
WBC # BLD AUTO: 9.67 THOUSAND/UL (ref 4.31–10.16)

## 2022-05-08 PROCEDURE — 85025 COMPLETE CBC W/AUTO DIFF WBC: CPT | Performed by: EMERGENCY MEDICINE

## 2022-05-08 PROCEDURE — 80048 BASIC METABOLIC PNL TOTAL CA: CPT | Performed by: EMERGENCY MEDICINE

## 2022-05-08 PROCEDURE — 93005 ELECTROCARDIOGRAM TRACING: CPT

## 2022-05-08 PROCEDURE — 84132 ASSAY OF SERUM POTASSIUM: CPT | Performed by: EMERGENCY MEDICINE

## 2022-05-08 PROCEDURE — 83735 ASSAY OF MAGNESIUM: CPT | Performed by: EMERGENCY MEDICINE

## 2022-05-08 PROCEDURE — 96360 HYDRATION IV INFUSION INIT: CPT

## 2022-05-08 PROCEDURE — 99285 EMERGENCY DEPT VISIT HI MDM: CPT

## 2022-05-08 PROCEDURE — 99285 EMERGENCY DEPT VISIT HI MDM: CPT | Performed by: EMERGENCY MEDICINE

## 2022-05-08 PROCEDURE — 96361 HYDRATE IV INFUSION ADD-ON: CPT

## 2022-05-08 PROCEDURE — 36415 COLL VENOUS BLD VENIPUNCTURE: CPT | Performed by: EMERGENCY MEDICINE

## 2022-05-08 PROCEDURE — 93010 ELECTROCARDIOGRAM REPORT: CPT | Performed by: INTERNAL MEDICINE

## 2022-05-08 PROCEDURE — 82075 ASSAY OF BREATH ETHANOL: CPT | Performed by: EMERGENCY MEDICINE

## 2022-05-08 RX ORDER — LORAZEPAM 1 MG/1
1 TABLET ORAL ONCE
Status: COMPLETED | OUTPATIENT
Start: 2022-05-08 | End: 2022-05-08

## 2022-05-08 RX ADMIN — SODIUM CHLORIDE 1000 ML: 0.9 INJECTION, SOLUTION INTRAVENOUS at 12:18

## 2022-05-08 RX ADMIN — LORAZEPAM 1 MG: 1 TABLET ORAL at 15:38

## 2022-05-08 NOTE — ED PROVIDER NOTES
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 9 years ago, presents to the emergency department by ambulance for alcohol intoxication  Patient was trying to check in to [facility] recovery center today for rehab for alcohol abuse however he was noted to be intoxicated and PMR sent him to the ED to await sobriety  Patient admits to daily alcohol use, on average 12 beers per day  He states he drank significantly last night and a small amount this morning  He admits to occasional marijuana use approximately once per month but otherwise denies any other drug use  He denies any thoughts of self-harm, suicidal or homicidal ideation, auditory or visual hallucinations  He has no complaints at this time other than feeling hungry  Of note, EMS did a pre-hospital 12 lead which was concerning for WPW syndrome  Patient reports that he was once told he had an arrhythmia but was never told about WPW  He denies any recent fevers or chills, headache, dizziness or near syncope, cough, URI symptoms, chest pain, palpitations, dyspnea, abdominal pain, nausea, vomiting, diarrhea, constipation, urinary symptoms, skin rash or color change, leg pain or swelling, extremity weakness or paresthesia or other focal neurologic deficits  History provided by:  Patient and EMS personnel   used: No    Alcohol Intoxication  Associated symptoms: no abdominal pain, no confusion, no hallucinations, no headaches, no nausea, no palpitations, no seizures, no shortness of breath, no suicidal ideation, no vomiting and no weakness        Prior to Admission Medications   Prescriptions Last Dose Informant Patient Reported?  Taking?   famotidine (PEPCID) 20 mg tablet  Self No No   Sig: Take 1 tablet (20 mg total) by mouth 2 (two) times a day   omeprazole (PriLOSEC) 20 mg delayed release capsule  Self No No   Sig: Take 1 capsule (20 mg total) by mouth daily      Facility-Administered Medications: None       Past Medical History:   Diagnosis Date    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[facility] [address] )     Renal disorder        Past Surgical History:   Procedure Laterality Date    KIDNEY SURGERY      SHOULDER SURGERY         History reviewed  No pertinent family history  I have reviewed and agree with the history as documented  E-Cigarette/Vaping    E-Cigarette Use Never User      E-Cigarette/Vaping Substances     Social History     Tobacco Use    Smoking status: Never Smoker    Smokeless tobacco: Current User     Types: Chew   Vaping Use    Vaping Use: Never used   Substance Use Topics    Alcohol use: Yes    Drug use: No       Review of Systems   Constitutional: Negative for chills and fever  HENT: Negative for congestion, ear pain, rhinorrhea and sore throat  Respiratory: Negative for cough, chest tightness, shortness of breath and wheezing  Cardiovascular: Negative for chest pain and palpitations  Gastrointestinal: Negative for abdominal pain, constipation, diarrhea, nausea and vomiting  Genitourinary: Negative for dysuria, flank pain, frequency and hematuria  Musculoskeletal: Negative for back pain, neck pain and neck stiffness  Skin: Negative for color change, pallor, rash and wound  Allergic/Immunologic: Negative for immunocompromised state  Neurological: Negative for dizziness, seizures, syncope, weakness, light-headedness, numbness and headaches  Hematological: Negative for adenopathy  Psychiatric/Behavioral: Negative for confusion, decreased concentration, hallucinations, self-injury and suicidal ideas  The patient is not nervous/anxious  All other systems reviewed and are negative  Physical Exam  Physical Exam  Vitals and nursing note reviewed  Constitutional:       General: He is not in acute distress  Appearance: Normal appearance  He is well-developed  He is not ill-appearing, toxic-appearing or diaphoretic  HENT:      Head: Normocephalic and atraumatic  Right Ear: External ear normal       Left Ear: External ear normal       Nose: Nose normal       Mouth/Throat:      Mouth: Mucous membranes are moist       Pharynx: Oropharynx is clear  No oropharyngeal exudate  Eyes:      Extraocular Movements: Extraocular movements intact  Conjunctiva/sclera: Conjunctivae normal       Pupils: Pupils are equal, round, and reactive to light  Neck:      Vascular: No JVD  Cardiovascular:      Rate and Rhythm: Normal rate and regular rhythm  Pulses: Normal pulses  Heart sounds: Normal heart sounds  No murmur heard  No friction rub  No gallop  Pulmonary:      Effort: Pulmonary effort is normal  No respiratory distress  Breath sounds: Normal breath sounds  No wheezing, rhonchi or rales  Abdominal:      General: There is no distension  Palpations: Abdomen is soft  Tenderness: There is no abdominal tenderness  There is no guarding or rebound  Musculoskeletal:         General: No swelling or tenderness  Normal range of motion  Cervical back: Normal range of motion and neck supple  No rigidity  Skin:     General: Skin is warm and dry  Coloration: Skin is not pale  Findings: No erythema or rash  Neurological:      General: No focal deficit present  Mental Status: He is alert and oriented to person, place, and time  Sensory: No sensory deficit  Motor: No weakness     Psychiatric:         Mood and Affect: Mood normal          Behavior: Behavior normal          Vital Signs  ED Triage Vitals   Temperature Pulse Respirations Blood Pressure SpO2   05/08/22 1159 05/08/22 1149 05/08/22 1149 05/08/22 1149 05/08/22 1149   98 2 °F (36 8 °C) 103 19 136/95 99 %      Temp Source Heart Rate Source Patient Position - Orthostatic VS BP Location FiO2 (%)   05/08/22 1159 -- -- -- --   Oral          Pain Score       --                Vitals:    05/08/22 1149 05/08/22 1159 05/08/22 1445 05/08/22 1530   BP: 136/95  135/76 136/76   Pulse: 103  (!) 116    Resp: 19  20    Temp:  98 2 °F (36 8 °C)     TempSrc:  Oral     SpO2: 99%  95%        Visual Acuity      ED Medications  Medications   sodium chloride 0 9 % bolus 1,000 mL (0 mL Intravenous Stopped 5/8/22 1359)   LORazepam (ATIVAN) tablet 1 mg (1 mg Oral Given 5/8/22 1538)       Diagnostic Studies  Results Reviewed     Procedure Component Value Units Date/Time    Potassium [[id number]]  (Normal) Collected: 05/08/22 1408    Lab Status: Final result Specimen: Blood from Arm, Right Updated: 05/08/22 1428     Potassium 4 2 mmol/L     POCT alcohol breath test [[id number]]  (Normal) Resulted: 05/08/22 1422    Lab Status: Final result Updated: 05/08/22 1422     EXTBreath Alcohol 5 481    Basic metabolic panel [[id number]]  (Abnormal) Collected: 05/08/22 1217    Lab Status: Final result Specimen: Blood from Arm, Left Updated: 05/08/22 1232     Sodium 136 mmol/L      Potassium 5 6 mmol/L      Chloride 102 mmol/L      CO2 25 mmol/L      ANION GAP 9 mmol/L      BUN 8 mg/dL      Creatinine 0 93 mg/dL      Glucose 88 mg/dL      Calcium 8 4 mg/dL      eGFR 110 ml/min/1 73sq m     Narrative:      Cornelius guidelines for Chronic Kidney Disease (CKD):     Stage 1 with normal or high GFR (GFR > 90 mL/min/1 73 square meters)    Stage 2 Mild CKD (GFR = 60-89 mL/min/1 73 square meters)    Stage 3A Moderate CKD (GFR = 45-59 mL/min/1 73 square meters)    Stage 3B Moderate CKD (GFR = 30-44 mL/min/1 73 square meters)    Stage 4 Severe CKD (GFR = 15-29 mL/min/1 73 square meters)    Stage 5 End Stage CKD (GFR <15 mL/min/1 73 square meters)  Note: GFR calculation is accurate only with a steady state creatinine    Magnesium [[id number]]  (Normal) Collected: 05/08/22 1214 Lab Status: Final result Specimen: Blood from Arm, Left Updated: 05/08/22 1232     Magnesium 2 6 mg/dL     CBC and differential [[id number]]  (Abnormal) Collected: 05/08/22 1217    Lab Status: Final result Specimen: Blood from Arm, Left Updated: 05/08/22 1223     WBC 9 67 Thousand/uL      RBC 5 18 Million/uL      Hemoglobin 16 7 g/dL      Hematocrit 47 9 %      MCV 93 fL      MCH 32 2 pg      MCHC 34 9 g/dL      RDW 11 9 %      MPV 8 6 fL      Platelets 017 Thousands/uL      nRBC 0 /100 WBCs      Neutrophils Relative 78 %      Immat GRANS % 0 %      Lymphocytes Relative 16 %      Monocytes Relative 6 %      Eosinophils Relative 0 %      Basophils Relative 0 %      Neutrophils Absolute 7 49 Thousands/µL      Immature Grans Absolute 0 04 Thousand/uL      Lymphocytes Absolute 1 50 Thousands/µL      Monocytes Absolute 0 59 Thousand/µL      Eosinophils Absolute 0 02 Thousand/µL      Basophils Absolute 0 03 Thousands/µL     POCT alcohol breath test [[id number]]  (Normal) Resulted: 05/08/22 1211    Lab Status: Final result Updated: 05/08/22 1211     EXTBreath Alcohol 0 206                 No orders to display              Procedures  ECG 12 Lead Documentation Only    Date/Time: 5/8/2022 12:17 PM  Performed by: Rozelle Jeans, DO  Authorized by: Rozelle Jeans, DO     Indications / Diagnosis:  WPW pattern  ECG reviewed by me, the ED Provider: yes    Patient location:  ED  Previous ECG:     Previous ECG:  Unavailable  Rate:     ECG rate:  96    ECG rate assessment: normal    Rhythm:     Rhythm: sinus rhythm    Ectopy:     Ectopy: none    QRS:     QRS axis:  Right    QRS intervals:   Wide  Conduction:     Conduction: abnormal (Ventricular pre-excitation, WPW pattern type A)    ST segments:     ST segments:  Normal  T waves:     T waves: normal               ED Course  ED Course as of 05/08/22 1602   Sun May 08, 2022   1208 [facility] texted on-call cardiologist, Dr Bridget Ledezma, for recommendations in regards to WPW pattern seen on EKG  [phone] with cardiology and he reported that as long as patient is not symptomatic or having any syncopal episodes it is not significantly tachycardic, no acute intervention to be done however he will need close outpatient Cardiology follow-up  1237 Potassium(!): 5 6  Hemolysis noted  Will recheck after IVF  1433 Potassium: 4 2   1523 Spoke with PMR and they accept clinically sober patients with ETOH level under 0 2 so patient can be discharged to [place] [address] now  Mother willing to drive patient  Patient and mom aware he will need cardiology consultation after he gets out of rehab  Discussed ED return parameters  1529 Patient reassessed prior to discharge and mildly tachycardic but other than feeling tired, he denies symptoms  Will give 1mg ativan for possible ETOH withdrawal  Mother to take patient to PMR  MDM  Number of Diagnoses or Management Options  Diagnosis management comments: 72-year-old male presents to the emergency department for alcohol intoxication  Patient has history of alcohol abuse and would like rehab and attempted to check himself into PMR however was too intoxicated and was sent here for sobriety prior to being accepted at [place] [address]  Incidentally, EMS noted WPW pattern on his 12 lead rhythm strip which appears to be most consistent with WPW syndrome  Patient is asymptomatic from a cardiac or arrhythmia standpoint    Will check basic electrolytes, EKG and consult cardiologist        Amount and/or Complexity of Data Reviewed  Clinical lab tests: ordered and reviewed  Tests in the medicine section of CPT®: reviewed and ordered  Obtain history from someone other than the patient: yes (EMS)  Discuss the patient with other providers: yes (Cardiologist, Dr Javi Pichardo)  Independent visualization of images, tracings, or specimens: yes        Disposition  Final diagnoses:   Alcohol intoxication in active alcoholic ([facility] [address] )   WPW (Daija-Parkinson-White syndrome)     Time reflects when diagnosis was documented in both MDM as applicable and the Disposition within this note     Time User Action Codes Description Comment    5/8/2022 12:23 PM Ruby RIGGINS Add [F10 129] Alcohol intoxication in active alcoholic ([address] )     4/9/7868 12:23 PM Ruby Brisker E Add [I45 6] WPW (Daija-Parkinson-White syndrome)       ED Disposition     ED Disposition Condition Date/Time Comment    Discharge Stable Sun May 8, 2022  3:22 PM Lisa Stinson discharge to [facility]  Patient's mother to drive patient to PMR  Follow-up Information     Follow up With Specialties Details Why Contact Info Additional Information    [facility] today       [address] [facility] Emergency Department Emergency Medicine Go to  If symptoms worsen [address] [facility] [id number] [facility] Emergency Department, [address] [facility], [facility], [address]  [facility] Schedule an appointment as soon as possible for a visit   [address]   [address] [place] [address] Cardiology [address], [facility], [address] [facility], [address] [phone]          Patient's Medications   Discharge Prescriptions    No medications on file       No discharge procedures on file      PDMP Review     None          ED Provider  Electronically Signed by           July Alvarez DO  05/08/22 6919

## 2022-12-21 ENCOUNTER — OFFICE VISIT (OUTPATIENT)
Dept: URGENT CARE | Facility: MEDICAL CENTER | Age: 32
End: 2022-12-21

## 2022-12-21 VITALS
HEIGHT: 72 IN | DIASTOLIC BLOOD PRESSURE: 64 MMHG | OXYGEN SATURATION: 96 % | WEIGHT: 164 LBS | SYSTOLIC BLOOD PRESSURE: 126 MMHG | TEMPERATURE: 99.4 F | BODY MASS INDEX: 22.21 KG/M2 | RESPIRATION RATE: 18 BRPM | HEART RATE: 102 BPM

## 2022-12-21 DIAGNOSIS — B34.9 ACUTE VIRAL SYNDROME: Primary | ICD-10-CM

## 2022-12-21 RX ORDER — DEXTROMETHORPHAN HYDROBROMIDE AND PROMETHAZINE HYDROCHLORIDE 15; 6.25 MG/5ML; MG/5ML
5 SOLUTION ORAL 4 TIMES DAILY PRN
Qty: 120 ML | Refills: 0 | Status: SHIPPED | OUTPATIENT
Start: 2022-12-21

## 2022-12-21 NOTE — PROGRESS NOTES
[facility]        NAME: Janae Sawyer is a 29 y o  male  : 1990    MRN: [id number]  DATE: 2022  TIME: 3:17 PM    Assessment and Plan   Acute viral syndrome [B34 9]  1  Acute viral syndrome  Promethazine-DM (PHENERGAN-DM) 6 25-15 mg/5 mL oral syrup    Covid19 and INFLUENZA A/B PCR            Patient Instructions     1  Over-the-counter ibuprofen and/or acetaminophen as needed for pain or fever  2  Oxymetazoline nasal spray 2 sprays in each nostril every 12 hours for no more than the next 5 days as needed for nasal congestion  3  Over-the-counter guaifenesin as needed for mucus relief  4  Gargle salt water as needed for sore throat relief  5  Increase oral fluid consumption  6  Follow-up with primary care provider in 7 days for any persistent symptoms  Chief Complaint     Chief Complaint   Patient presents with   • Cold Like Symptoms     Pt  C/O cough, congestion, sore throat, fever, nausea, and aches for the past 2 days  Home Covid test was negative         History of Present Illness       42-year-old male patient with a 24 hour history of fever, chills, body aches, cough, nasal congestion, significant sore throat  Denies any shortness of breath or chest pain  No GI symptoms  Tested negative for COVID at home  Review of Systems   Review of Systems   Constitutional: Positive for appetite change, chills, fatigue and fever  HENT: Positive for congestion, rhinorrhea, sinus pressure and sore throat  Negative for facial swelling and sinus pain  Respiratory: Positive for cough  Negative for shortness of breath  Cardiovascular: Negative for chest pain  Gastrointestinal: Negative for abdominal pain, diarrhea, nausea and vomiting  Musculoskeletal: Positive for myalgias  Skin: Negative for rash  Neurological: Negative for dizziness           Current Medications       Current Outpatient Medications:   •  Promethazine-DM (PHENERGAN-DM) 6 25-15 mg/5 mL oral syrup, Take 5 mL by mouth 4 (four) times a day as needed for cough, Disp: 120 mL, Rfl: 0  •  famotidine (PEPCID) 20 mg tablet, Take 1 tablet (20 mg total) by mouth 2 (two) times a day (Patient not taking: Reported on 12/21/2022), Disp: 60 tablet, Rfl: 0  •  omeprazole (PriLOSEC) 20 mg delayed release capsule, Take 1 capsule (20 mg total) by mouth daily (Patient not taking: Reported on 12/21/2022), Disp: 30 capsule, Rfl: 11    Current Allergies     Allergies as of 12/21/2022   • (No Known Allergies)            The following portions of the patient's history were reviewed and updated as appropriate: allergies, current medications, past family history, past medical history, past social history, past surgical history and problem list      Past Medical History:   Diagnosis Date   • 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[facility] [address] )    • Renal disorder        Past Surgical History:   Procedure Laterality Date   • KIDNEY SURGERY     • SHOULDER SURGERY         No family history on file  Medications have been verified  Objective   /64   Pulse 102   Temp 99 4 °F (37 4 °C)   Resp 18   Ht 6' (1 829 m)   Wt 74 4 kg (164 lb)   SpO2 96%   BMI 22 24 kg/m²        Physical Exam     Physical Exam  Vitals and nursing note reviewed  Constitutional:       General: He is not in acute distress  Appearance: He is ill-appearing  HENT:      Head: Normocephalic  Nose: Congestion and rhinorrhea present  Mouth/Throat:      Mouth: Mucous membranes are dry  Pharynx: Posterior oropharyngeal erythema present  Eyes:      Conjunctiva/sclera: Conjunctivae normal       Pupils: Pupils are equal, round, and reactive to light  Cardiovascular:      Rate and Rhythm: Normal rate and regular rhythm  Pulses: Normal pulses  Pulmonary:      Effort: Pulmonary effort is normal       Breath sounds: Normal breath sounds  Abdominal:      Tenderness: There is no abdominal tenderness  Musculoskeletal:         General: Normal range of motion  Cervical back: Normal range of motion and neck supple  No rigidity  Skin:     General: Skin is warm and dry  Capillary Refill: Capillary refill takes less than 2 seconds  Neurological:      General: No focal deficit present  Mental Status: He is alert and oriented to person, place, and time     Psychiatric:         Mood and Affect: Mood normal          Behavior: Behavior normal

## 2022-12-22 LAB
FLUAV RNA RESP QL NAA+PROBE: NEGATIVE
FLUBV RNA RESP QL NAA+PROBE: NEGATIVE
SARS-COV-2 RNA RESP QL NAA+PROBE: NEGATIVE

## 2022-12-23 ENCOUNTER — OFFICE VISIT (OUTPATIENT)
Dept: URGENT CARE | Facility: MEDICAL CENTER | Age: 32
End: 2022-12-23

## 2022-12-23 VITALS
OXYGEN SATURATION: 100 % | WEIGHT: 164 LBS | TEMPERATURE: 99.1 F | HEART RATE: 60 BPM | BODY MASS INDEX: 22.21 KG/M2 | HEIGHT: 72 IN | RESPIRATION RATE: 18 BRPM

## 2022-12-23 DIAGNOSIS — J02.9 ACUTE PHARYNGITIS, UNSPECIFIED ETIOLOGY: Primary | ICD-10-CM

## 2022-12-23 DIAGNOSIS — B34.9 ACUTE VIRAL SYNDROME: ICD-10-CM

## 2022-12-23 DIAGNOSIS — R11.2 NAUSEA AND VOMITING, UNSPECIFIED VOMITING TYPE: ICD-10-CM

## 2022-12-23 LAB — S PYO AG THROAT QL: NEGATIVE

## 2022-12-23 RX ORDER — PREDNISONE 20 MG/1
TABLET ORAL
Qty: 9 TABLET | Refills: 0 | Status: SHIPPED | OUTPATIENT
Start: 2022-12-23

## 2022-12-23 RX ORDER — ONDANSETRON 4 MG/1
4-8 TABLET, FILM COATED ORAL EVERY 8 HOURS PRN
Qty: 20 TABLET | Refills: 0 | Status: SHIPPED | OUTPATIENT
Start: 2022-12-23

## 2022-12-23 NOTE — PROGRESS NOTES
[facility]        NAME: Annmarie Chua is a 29 y o  male  : 1990    MRN: [id number]  DATE: 2022  TIME: 3:51 PM    Assessment and Plan   Acute pharyngitis, unspecified etiology [J02 9]  1  Acute pharyngitis, unspecified etiology  POCT rapid strepA    Throat culture    predniSONE 20 mg tablet      2  Nausea and vomiting, unspecified vomiting type  ondansetron (ZOFRAN) 4 mg tablet      3  Acute viral syndrome              Patient Instructions     1  Over-the-counter ibuprofen and/or acetaminophen as needed for pain or fever  2  Gargle salt water as needed for sore throat pain relief  3  Increase oral fluids  4  Observe a liquid and/or soft diet until symptoms improve  5  Go to the ER immediately for any worsening symptoms, inability to swallow, shortness of breath, or any other ominous symptoms  Chief Complaint     Chief Complaint   Patient presents with   • Sore Throat     Patient states he was here two days ago and received flu/covid swab which was negative; patient states his sore throat has gotten progressively worse since then; associated with nausea and vomiting          History of Present Illness       30-year-old male patient who presents here for follow-up after having been seen 2 days ago for flu-like symptoms  COVID and flu tests were negative  Patient states that since that time the cough is improved but his sore throat has worsened and yesterday developed nausea and vomiting  No diarrhea  No belly pain  No recurrent fevers or chills  He continues to be fatigued  Review of Systems   Review of Systems   Constitutional: Positive for fatigue  Negative for chills and fever  HENT: Positive for sore throat and trouble swallowing  Negative for ear pain  Eyes: Negative for pain and visual disturbance  Respiratory: Negative for cough and shortness of breath  Cardiovascular: Negative for chest pain and palpitations     Gastrointestinal: Positive for nausea and vomiting  Negative for abdominal pain and diarrhea  Genitourinary: Negative for dysuria and hematuria  Musculoskeletal: Negative for arthralgias and back pain  Skin: Negative for color change and rash  Neurological: Negative for seizures and syncope  All other systems reviewed and are negative  Current Medications       Current Outpatient Medications:   •  ondansetron (ZOFRAN) 4 mg tablet, Take 1-2 tablets (4-8 mg total) by mouth every 8 (eight) hours as needed for nausea or vomiting, Disp: 20 tablet, Rfl: 0  •  predniSONE 20 mg tablet, Take all 3 tablets together once daily in the morning x3 days, Disp: 9 tablet, Rfl: 0  •  famotidine (PEPCID) 20 mg tablet, Take 1 tablet (20 mg total) by mouth 2 (two) times a day (Patient not taking: Reported on 12/21/2022), Disp: 60 tablet, Rfl: 0  •  omeprazole (PriLOSEC) 20 mg delayed release capsule, Take 1 capsule (20 mg total) by mouth daily (Patient not taking: Reported on 12/21/2022), Disp: 30 capsule, Rfl: 11  •  Promethazine-DM (PHENERGAN-DM) 6 25-15 mg/5 mL oral syrup, Take 5 mL by mouth 4 (four) times a day as needed for cough, Disp: 120 mL, Rfl: 0    Current Allergies     Allergies as of 12/23/2022   • (No Known Allergies)            The following portions of the patient's history were reviewed and updated as appropriate: allergies, current medications, past family history, past medical history, past social history, past surgical history and problem list      Past Medical History:   Diagnosis Date   • 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[facility] [address] )    • Renal disorder        Past Surgical History:   Procedure Laterality Date   • KIDNEY SURGERY     • SHOULDER SURGERY         No family history on file  Medications have been verified  Objective   Pulse 60   Temp 99 1 °F (37 3 °C) (Temporal)   Resp 18   Ht 6' (1 829 m)   Wt 74 4 kg (164 lb)   SpO2 100%   BMI 22 24 kg/m²        Physical Exam     Physical Exam  Vitals and nursing note reviewed     Constitutional: General: He is not in acute distress  Appearance: Normal appearance  He is not ill-appearing  HENT:      Head: Normocephalic  Right Ear: Tympanic membrane normal       Left Ear: Tympanic membrane normal       Nose: Nose normal       Mouth/Throat:      Mouth: Mucous membranes are moist       Pharynx: Pharyngeal swelling and posterior oropharyngeal erythema present  No oropharyngeal exudate  Tonsils: No tonsillar exudate  1+ on the right  1+ on the left  Eyes:      Conjunctiva/sclera: Conjunctivae normal       Pupils: Pupils are equal, round, and reactive to light  Cardiovascular:      Rate and Rhythm: Normal rate and regular rhythm  Pulses: Normal pulses  Pulmonary:      Effort: Pulmonary effort is normal       Breath sounds: Normal breath sounds  Abdominal:      General: There is no distension  Tenderness: There is no abdominal tenderness  There is no rebound  Musculoskeletal:         General: Normal range of motion  Cervical back: Normal range of motion and neck supple  Skin:     General: Skin is warm and dry  Capillary Refill: Capillary refill takes less than 2 seconds  Neurological:      Mental Status: He is alert and oriented to person, place, and time  Psychiatric:         Mood and Affect: Mood normal          Behavior: Behavior normal          Note:   Prednisone prescribed for severe throat inflammation

## 2022-12-25 LAB — BACTERIA THROAT CULT: NORMAL

## 2023-11-27 NOTE — DISCHARGE INSTRUCTIONS
Daija-Parkinson-White Syndrome   WHAT YOU NEED TO KNOW:   Daija-Parkinson-White (WPW) syndrome is a condition that causes tachycardia (fast heartbeat)  A normal heartbeat is about 60 to 100 beats per minute  WPW causes 100 or more heartbeats per minute  WPW develops because an extra piece of heart muscle causes more electrical activity within your heart  WPW can develop for no known reason  Congenital heart disease or a family history of WPW can increase your risk  DISCHARGE INSTRUCTIONS:   Seek care immediately if:   · You have chest pain  · You have fast or abnormal heartbeats even after treatment  · You feel dizzy or faint  Contact your healthcare provider if:   · You have questions or concerns about your condition or care  Medicines:   · Medicines  may be given to slow or regulate your heartbeat  · Take your medicine as directed  Contact your healthcare provider if you think your medicine is not helping or if you have side effects  Tell him of her if you are allergic to any medicine  Keep a list of the medicines, vitamins, and herbs you take  Include the amounts, and when and why you take them  Bring the list or the pill bottles to follow-up visits  Carry your medicine list with you in case of an emergency  Follow up with your healthcare provider as directed:  Write down your questions so you remember to ask them during your visits  Vagal maneuvers:  Vagal maneuvers are methods that can slow your heartbeat during a WPW episode  Your healthcare provider may recommend you cough, gag, hold your breath, or put ice on your face  Do not smoke:  Smoking narrows blood vessels in your heart  Narrow blood vessels make your heart work harder  Smoking can also damage your heart  Ask your healthcare provider for information if you currently smoke and need help quitting  Carry medical alert identification:  Wear jewelry or carry a card that says you have WPW   Ask your healthcare provider where to get these items  Exercise as directed:  Exercise can cause episodes of irregular heartbeats  Ask your healthcare provider how much exercise you need each day and which exercises are safe for you  Ask if you can play sports  Limit caffeine:  Caffeine can make your heartbeat faster  © Copyright [employer] 2022 Information is for End User's use only and may not be sold, redistributed or otherwise used for commercial purposes  All illustrations and images included in CareNotes® are the copyrighted property of [employer]  or [facility] Efrain Clark   The above information is an  only  It is not intended as medical advice for individual conditions or treatments  Talk to your doctor, nurse or pharmacist before following any medical regimen to see if it is safe and effective for you  90.7

## 2023-12-27 ENCOUNTER — TELEPHONE (OUTPATIENT)
Age: 33
End: 2023-12-27

## 2023-12-27 NOTE — TELEPHONE ENCOUNTER
Patient called stating he has a left side testicular hydrocele.  He stated he is having some pain on his right testicle now . It started 3 days ago.  He wanted to schedule a consult.  Patient scheduled with Dr. Leavitt in [place] 01/11/24 at 815 am.     Patient can be reached at [phone]

## 2024-01-11 ENCOUNTER — OFFICE VISIT (OUTPATIENT)
Dept: UROLOGY | Facility: CLINIC | Age: 34
End: 2024-01-11
Payer: COMMERCIAL

## 2024-01-11 VITALS
HEART RATE: 103 BPM | WEIGHT: 165.2 LBS | BODY MASS INDEX: 22.37 KG/M2 | HEIGHT: 72 IN | DIASTOLIC BLOOD PRESSURE: 72 MMHG | OXYGEN SATURATION: 99 % | SYSTOLIC BLOOD PRESSURE: 122 MMHG | RESPIRATION RATE: 16 BRPM

## 2024-01-11 DIAGNOSIS — C80.1 GERM CELL CANCER (HCC): Primary | ICD-10-CM

## 2024-01-11 DIAGNOSIS — N43.3 HYDROCELE IN ADULT: ICD-10-CM

## 2024-01-11 PROCEDURE — 99214 OFFICE O/P EST MOD 30 MIN: CPT | Performed by: UROLOGY

## 2024-01-11 NOTE — PATIENT INSTRUCTIONS
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  We will also get tumor markers.  If you decide you would like to get an ultrasound of the kidney and chest x-ray, please let me know otherwise we will just discuss the results of the scrotal ultrasound and tumor markers and proceed with hydrocele repair as we discussed.  You will need probably a week off of work.  I will be operating every second and fourth Wednesday at [facility] if that helps you with scheduling.  If you decide not to do the surgery and the tumor markers are normal please be aware that you should have routine follow-up even though we did not make one today.

## 2024-01-11 NOTE — PROGRESS NOTES
Igor Funez is a(n) 33 y.o. male. 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 A diagnosis of Hydrocele in adult was also pertinent to this visit.    Assessment/Plan  33 M left and right testis pain.  Hydrocele since  on the left.  Notes it is increased in size may be doubled in the last year.  Causing discomfort.  Wants it surgically repaired.  Wants to get tumor markers checked.  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  He also had an RPLND.  Could have just predisposed him to hydrocele formation.  He is aware will take a week or so for him to have the ability to go back to work but may be 6 weeks before it looks normal.  There is always a risk of infection and recurrence.    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  We will also get tumor markers.  If you decide you would like to get an ultrasound of the kidney and chest x-ray, please let me know otherwise we will just discuss the results of the scrotal ultrasound and tumor markers and proceed with hydrocele repair as we discussed.  You will need probably a week off of work.  I will be operating every second and fourth Wednesday at [facility] if that helps you with scheduling.  If you decide not to do the surgery and the tumor markers are normal please be aware that you should have routine follow-up even though we did not make one today.     Radiology  Left hydrocele on scrotal ultrasound 2021   CT abdomen pelvis with contrast 2022 showed normal enhancement of kidneys.  Right kidney shows  prior partial nephrectomy but no evidence of recurrence.     History  Retroperitoneal tumor and nephrectomy 2013? [facility]. NSGCT - 4 cycles of PEB.  Got TIP.  Left hydrocele since 2014.      Prior Visits  11/30/2021 Urology  Igor Funez is a 31 y.o.  Male with history of partial nephrectomy 2013 at [place] for  germ cell tumors of the kidney. He has not followed up in several years.  He presents with 1 week history of left testicular swelling.  He went to the emergency room and an ultrasound of the scrotum and testes showed a left-sided hydrocele.  He has no pain.  He denies any weight loss.  He denies any trauma.  He works as a .  Last labs were in 2016.     12/22/2021 Tele  31 yr male with left retroperitoneal germ cell tumor  Partial nephrectomy with RP mass dissection 2013 @ [facility]  At that time bilateral 3mm pulm nodules on CT 2013, 2014, 2015     Current symptom left testicular swelling. US with hydrocele no mass. Hydrocele seen on scrotal US 2014 as well.     Current request for CT c/a/p denied. Needs basic imaging prior if concerned for recurrence     Start with the following:  CXR  US k/b     Please also retrieve path reports from [facility] 2013 for documentation as well as for further imaging for restaging PRN. If CXR or US is concerning can order a followup ct.          1/11/2024 OV Dagmar  33 M left and right testis pain.  Hydrocele since 2014 on the left.  Notes it is increased in size may be doubled in the last year.  Causing discomfort.  Wants it surgically repaired.  Wants to get tumor markers checked.  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  He also had an RPLND.  Could have just predisposed him to hydrocele formation.  He is aware will take a week or so for him to have the ability to go back to work but may be 6 weeks before  "it looks normal.  There is always a risk of infection and recurrence.    No results found for: \"PSA\"  No components found for: \"CR\"    No Known Allergies    Review of Systems    Past Surgical History:   Procedure Laterality Date    KIDNEY SURGERY      SHOULDER SURGERY         History reviewed. No pertinent family history.    Social History     Socioeconomic History    Marital status: Single     Spouse name: Not on file    Number of children: Not on file    Years of education: Not on file    Highest education level: Not on file   Occupational History    Not on file   Tobacco Use    Smoking status: Never    Smokeless tobacco: Current     Types: Chew   Vaping Use    Vaping status: Never Used   Substance and Sexual Activity    Alcohol use: Yes    Drug use: No    Sexual activity: Yes   Other Topics Concern    Not on file   Social History Narrative    Not on file     Social Determinants of Health     Financial Resource Strain: Not on file   Food Insecurity: Not on file   Transportation Needs: Not on file   Physical Activity: Not on file   Stress: Not on file   Social Connections: Not on file   Intimate Partner Violence: Not on file   Housing Stability: Not on file       No current outpatient medications on file.    Objective     /72 (BP Location: Left arm, Patient Position: Sitting, Cuff Size: Large)   Pulse 103   Resp 16   Ht 6' (1.829 m)   Wt 74.9 kg (165 lb 3.2 oz)   SpO2 99%   BMI 22.41 kg/m²     Physical Exam      Rebel Dagmar, [facility]  "

## 2024-01-17 ENCOUNTER — APPOINTMENT (OUTPATIENT)
Dept: LAB | Facility: MEDICAL CENTER | Age: 34
End: 2024-01-17
Payer: COMMERCIAL

## 2024-01-17 DIAGNOSIS — C80.1 GERM CELL CANCER (HCC): ICD-10-CM

## 2024-01-17 LAB
AFP-TM SERPL-MCNC: 2.75 NG/ML (ref 0–9)
HCG-TM SERPL-SCNC: <1 MLU/ML

## 2024-01-17 PROCEDURE — 84702 CHORIONIC GONADOTROPIN TEST: CPT

## 2024-01-17 PROCEDURE — 36415 COLL VENOUS BLD VENIPUNCTURE: CPT

## 2024-01-17 PROCEDURE — 82105 ALPHA-FETOPROTEIN SERUM: CPT

## 2024-01-18 ENCOUNTER — HOSPITAL ENCOUNTER (OUTPATIENT)
Dept: ULTRASOUND IMAGING | Facility: HOSPITAL | Age: 34
End: 2024-01-18
Payer: COMMERCIAL

## 2024-01-18 DIAGNOSIS — N43.3 HYDROCELE IN ADULT: ICD-10-CM

## 2024-01-18 PROCEDURE — 76870 US EXAM SCROTUM: CPT

## 2024-05-01 ENCOUNTER — RA CDI HCC (OUTPATIENT)
Dept: OTHER | Facility: HOSPITAL | Age: 34
End: 2024-05-01

## 2024-05-01 PROBLEM — Z90.5 HISTORY OF PARTIAL NEPHRECTOMY: Status: ACTIVE | Noted: 2024-05-01

## 2024-05-01 NOTE — PROGRESS NOTES
Chart review - C80.1 oncology audit     C80.1 (Germ Cell Cancer) noted 1/11/24 Urology visit. US ordered from this visit did not note any cancer. No dx on problem list.

## 2024-11-04 ENCOUNTER — APPOINTMENT (EMERGENCY)
Dept: CT IMAGING | Facility: HOSPITAL | Age: 34
End: 2024-11-04
Payer: COMMERCIAL

## 2024-11-04 ENCOUNTER — HOSPITAL ENCOUNTER (EMERGENCY)
Facility: HOSPITAL | Age: 34
Discharge: HOME | End: 2024-11-04
Attending: EMERGENCY MEDICINE | Admitting: EMERGENCY MEDICINE
Payer: COMMERCIAL

## 2024-11-04 VITALS
SYSTOLIC BLOOD PRESSURE: 146 MMHG | DIASTOLIC BLOOD PRESSURE: 89 MMHG | OXYGEN SATURATION: 98 % | TEMPERATURE: 97.8 F | HEART RATE: 92 BPM | RESPIRATION RATE: 16 BRPM

## 2024-11-04 DIAGNOSIS — R10.9 FLANK PAIN: Primary | ICD-10-CM

## 2024-11-04 DIAGNOSIS — Z90.5 HISTORY OF PARTIAL NEPHRECTOMY: ICD-10-CM

## 2024-11-04 LAB
ALBUMIN SERPL BCG-MCNC: 4.6 G/DL (ref 3.5–5)
ALP SERPL-CCNC: 61 U/L (ref 34–104)
ALT SERPL W P-5'-P-CCNC: 16 U/L (ref 7–52)
ANION GAP SERPL CALCULATED.3IONS-SCNC: 8 MMOL/L (ref 4–13)
AST SERPL W P-5'-P-CCNC: 16 U/L (ref 13–39)
BASOPHILS # BLD AUTO: 0.03 THOUSANDS/ΜL (ref 0–0.1)
BASOPHILS NFR BLD AUTO: 0 % (ref 0–1)
BILIRUB SERPL-MCNC: 1.2 MG/DL (ref 0.2–1)
BILIRUB UR QL STRIP: NEGATIVE
BUN SERPL-MCNC: 9 MG/DL (ref 5–25)
CALCIUM SERPL-MCNC: 9.5 MG/DL (ref 8.4–10.2)
CHLORIDE SERPL-SCNC: 104 MMOL/L (ref 96–108)
CLARITY UR: CLEAR
CO2 SERPL-SCNC: 27 MMOL/L (ref 21–32)
COLOR UR: COLORLESS
CREAT SERPL-MCNC: 1.16 MG/DL (ref 0.6–1.3)
EOSINOPHIL # BLD AUTO: 0.09 THOUSAND/ΜL (ref 0–0.61)
EOSINOPHIL NFR BLD AUTO: 1 % (ref 0–6)
ERYTHROCYTE [DISTWIDTH] IN BLOOD BY AUTOMATED COUNT: 11.9 % (ref 11.6–15.1)
GFR SERPL CREATININE-BSD FRML MDRD: 82 ML/MIN/1.73SQ M
GLUCOSE SERPL-MCNC: 88 MG/DL (ref 65–140)
GLUCOSE UR STRIP-MCNC: NEGATIVE MG/DL
HCT VFR BLD AUTO: 45.1 % (ref 36.5–49.3)
HGB BLD-MCNC: 15.7 G/DL (ref 12–17)
HGB UR QL STRIP.AUTO: NEGATIVE
IMM GRANULOCYTES # BLD AUTO: 0.01 THOUSAND/UL (ref 0–0.2)
IMM GRANULOCYTES NFR BLD AUTO: 0 % (ref 0–2)
KETONES UR STRIP-MCNC: NEGATIVE MG/DL
LEUKOCYTE ESTERASE UR QL STRIP: NEGATIVE
LIPASE SERPL-CCNC: 27 U/L (ref 11–82)
LYMPHOCYTES # BLD AUTO: 2.3 THOUSANDS/ΜL (ref 0.6–4.47)
LYMPHOCYTES NFR BLD AUTO: 26 % (ref 14–44)
MCH RBC QN AUTO: 30.5 PG (ref 26.8–34.3)
MCHC RBC AUTO-ENTMCNC: 34.8 G/DL (ref 31.4–37.4)
MCV RBC AUTO: 88 FL (ref 82–98)
MONOCYTES # BLD AUTO: 0.68 THOUSAND/ΜL (ref 0.17–1.22)
MONOCYTES NFR BLD AUTO: 8 % (ref 4–12)
NEUTROPHILS # BLD AUTO: 5.76 THOUSANDS/ΜL (ref 1.85–7.62)
NEUTS SEG NFR BLD AUTO: 65 % (ref 43–75)
NITRITE UR QL STRIP: NEGATIVE
NRBC BLD AUTO-RTO: 0 /100 WBCS
PH UR STRIP.AUTO: 6.5 [PH]
PLATELET # BLD AUTO: 277 THOUSANDS/UL (ref 149–390)
PMV BLD AUTO: 9.5 FL (ref 8.9–12.7)
POTASSIUM SERPL-SCNC: 3.6 MMOL/L (ref 3.5–5.3)
PROT SERPL-MCNC: 7.1 G/DL (ref 6.4–8.4)
PROT UR STRIP-MCNC: NEGATIVE MG/DL
RBC # BLD AUTO: 5.14 MILLION/UL (ref 3.88–5.62)
SODIUM SERPL-SCNC: 139 MMOL/L (ref 135–147)
SP GR UR STRIP.AUTO: 1.01 (ref 1–1.03)
UROBILINOGEN UR STRIP-ACNC: <2 MG/DL
WBC # BLD AUTO: 8.87 THOUSAND/UL (ref 4.31–10.16)

## 2024-11-04 PROCEDURE — 80053 COMPREHEN METABOLIC PANEL: CPT | Performed by: EMERGENCY MEDICINE

## 2024-11-04 PROCEDURE — 81003 URINALYSIS AUTO W/O SCOPE: CPT

## 2024-11-04 PROCEDURE — 74177 CT ABD & PELVIS W/CONTRAST: CPT

## 2024-11-04 PROCEDURE — 99285 EMERGENCY DEPT VISIT HI MDM: CPT | Performed by: EMERGENCY MEDICINE

## 2024-11-04 PROCEDURE — 99284 EMERGENCY DEPT VISIT MOD MDM: CPT

## 2024-11-04 PROCEDURE — 85025 COMPLETE CBC W/AUTO DIFF WBC: CPT | Performed by: EMERGENCY MEDICINE

## 2024-11-04 PROCEDURE — 36415 COLL VENOUS BLD VENIPUNCTURE: CPT

## 2024-11-04 PROCEDURE — 83690 ASSAY OF LIPASE: CPT | Performed by: EMERGENCY MEDICINE

## 2024-11-04 RX ADMIN — IOHEXOL 70 ML: 350 INJECTION, SOLUTION INTRAVENOUS at 17:39

## 2024-11-05 ENCOUNTER — TELEPHONE (OUTPATIENT)
Age: 34
End: 2024-11-05

## 2024-11-05 NOTE — TELEPHONE ENCOUNTER
Patient called to schedule ER follow up appt. Pt asked to be scheduled in [place] office. Pt is scheduled on 11/12 at 3:20 PM with AP [place] for bladder thickening and high levels of bilirubin.    Pt does not have  PCP at this time but was referred to one while in ER.

## 2024-11-05 NOTE — DISCHARGE INSTRUCTIONS
Continue to monitor symptoms - if they continue to worsen, return to the ER for evaluation. Take motrin/tylenol for flank pain and given your history, we recommend you follow up with both urology and your PCP for further workup as needed. Referral for urology has been placed.

## 2024-11-05 NOTE — ED PROVIDER NOTES
Time reflects when diagnosis was documented in both MDM as applicable and the Disposition within this note       Time User Action Codes Description Comment    11/4/2024  6:58 PM Mandy Prieto [R10.9] Flank pain     11/4/2024  6:58 PM Mandy Prieto [Z90.5] History of partial nephrectomy           ED Disposition       ED Disposition   Discharge    Condition   Stable    Date/Time   Mon Nov 4, 2024  6:58 PM    Comment   Igor Funez discharge to home/self care.                   Assessment & Plan       Medical Decision Making  34-year-old male with history of testicular cancer presenting with unintentional weight loss and flank pain.  Initial differential diagnosis includes but is not limited to pyelonephritis, cystitis, musculoskeletal back pain, recurrence of malignancy among others.  With this in mind, lab work and CT scan of the abdomen and pelvis was done.  Lab work showed no clinically significant abnormality and CT scan showed some bladder thickening potentially concerning for cystitis in the possible clinical context, however, urine testing was not consistent with UTI.  Patient was counseled on these results and given his history, current symptoms, was given number for family medicine office and referral to urology for follow-up for further workup as clinically indicated.  At time of discharge, all questions were answered to patient's satisfaction and he was in agreement with plan.    Problems Addressed:  Flank pain: acute illness or injury  History of partial nephrectomy: acute illness or injury    Amount and/or Complexity of Data Reviewed  Labs: ordered.  Radiology: ordered.    Risk  Prescription drug management.             Medications   iohexol (OMNIPAQUE) 350 MG/ML injection (MULTI-DOSE) 70 mL (70 mL Intravenous Given 11/4/24 4393)       ED Risk Strat Scores                                               History of Present Illness       Chief Complaint   Patient presents with    Flank Pain      Pt reports bilateral flank pain for a few weeks. Hx of cancer and has noticed recent weight loss       Past Medical History:   Diagnosis Date    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HCC)     Renal disorder       Past Surgical History:   Procedure Laterality Date    KIDNEY SURGERY      SHOULDER SURGERY        History reviewed. No pertinent family history.   Social History     Tobacco Use    Smoking status: Never    Smokeless tobacco: Current     Types: Chew   Vaping Use    Vaping status: Never Used   Substance Use Topics    Alcohol use: Yes    Drug use: No      E-Cigarette/Vaping    E-Cigarette Use Never User       E-Cigarette/Vaping Substances      I have reviewed and agree with the history as documented.     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  He has had intermittent burning sensation that he describes as feeling as if his kidneys are burning.  Denies any burning with urination or hematuria.  He is also concerned because he feels he has unintentionally lost some weight over the last few months but only realized it after stepping on the scale recently.  He says his appetite is not as good as it used to be but denies any significant abdominal pain, fevers, shortness of breath, chest pain.  Since his treatment approximately 11 years ago, he has not followed up with PCP or urology routinely.    Review of Systems   Constitutional:  Positive for appetite change and unexpected weight change. Negative for chills and fever.   HENT:  Negative for ear pain and sore throat.    Eyes: Negative.    Respiratory:  Negative for cough and shortness of breath.    Cardiovascular:  Negative for chest pain and palpitations.   Gastrointestinal:  Negative for abdominal pain and vomiting.   Genitourinary:  Positive for flank pain. Negative for dysuria and hematuria.   Musculoskeletal:  Negative for arthralgias and back pain.    Skin:  Negative for color change and rash.   Neurological:  Negative for syncope and light-headedness.   All other systems reviewed and are negative.          Objective       ED Triage Vitals [11/04/24 1608]   Temperature Pulse Blood Pressure Respirations SpO2 Patient Position - Orthostatic VS   97.8 °F (36.6 °C) 92 146/89 16 98 % --      Temp Source Heart Rate Source BP Location FiO2 (%) Pain Score    Oral Monitor Right arm -- --      Vitals      Date and Time Temp Pulse SpO2 Resp BP Pain Score FACES Pain Rating User   11/04/24 1608 97.8 °F (36.6 °C) 92 98 % 16 146/89 -- -- NS            Physical Exam  Vitals and nursing note reviewed.   Constitutional:       General: He is not in acute distress.     Appearance: He is well-developed.   HENT:      Head: Normocephalic and atraumatic.   Eyes:      Conjunctiva/sclera: Conjunctivae normal.   Cardiovascular:      Rate and Rhythm: Normal rate and regular rhythm.      Heart sounds: No murmur heard.  Pulmonary:      Effort: Pulmonary effort is normal. No respiratory distress.      Breath sounds: Normal breath sounds.   Abdominal:      Palpations: Abdomen is soft.      Tenderness: There is no abdominal tenderness. There is right CVA tenderness and left CVA tenderness.      Comments: Mild suprapubic tenderness   Musculoskeletal:         General: No swelling.   Skin:     General: Skin is warm and dry.   Neurological:      Mental Status: He is alert.   Psychiatric:         Mood and Affect: Mood normal.         Results Reviewed       Procedure Component Value Units Date/Time    UA w Reflex to Microscopic w Reflex to Culture [[id number]] Collected: 11/04/24 1731    Lab Status: Final result Specimen: Urine, Clean Catch Updated: 11/04/24 1741     Color, UA Colorless     Clarity, UA Clear     Specific Gravity, UA 1.008     pH, UA 6.5     Leukocytes, UA Negative     Nitrite, UA Negative     Protein, UA Negative mg/dl      Glucose, UA Negative mg/dl      Ketones, UA Negative mg/dl       Urobilinogen, UA <2.0 mg/dl      Bilirubin, UA Negative     Occult Blood, UA Negative    Comprehensive metabolic panel [[id number]]  (Abnormal) Collected: 11/04/24 1613    Lab Status: Final result Specimen: Blood from Arm, Right Updated: 11/04/24 1641     Sodium 139 mmol/L      Potassium 3.6 mmol/L      Chloride 104 mmol/L      CO2 27 mmol/L      ANION GAP 8 mmol/L      BUN 9 mg/dL      Creatinine 1.16 mg/dL      Glucose 88 mg/dL      Calcium 9.5 mg/dL      AST 16 U/L      ALT 16 U/L      Alkaline Phosphatase 61 U/L      Total Protein 7.1 g/dL      Albumin 4.6 g/dL      Total Bilirubin 1.20 mg/dL      eGFR 82 ml/min/1.73sq m     Narrative:      National Kidney Disease Foundation guidelines for Chronic Kidney Disease (CKD):     Stage 1 with normal or high GFR (GFR > 90 mL/min/1.73 square meters)    Stage 2 Mild CKD (GFR = 60-89 mL/min/1.73 square meters)    Stage 3A Moderate CKD (GFR = 45-59 mL/min/1.73 square meters)    Stage 3B Moderate CKD (GFR = 30-44 mL/min/1.73 square meters)    Stage 4 Severe CKD (GFR = 15-29 mL/min/1.73 square meters)    Stage 5 End Stage CKD (GFR <15 mL/min/1.73 square meters)  Note: GFR calculation is accurate only with a steady state creatinine    Lipase [[id number]]  (Normal) Collected: 11/04/24 1613    Lab Status: Final result Specimen: Blood from Arm, Right Updated: 11/04/24 1641     Lipase 27 u/L     CBC and differential [[id number]] Collected: 11/04/24 1613    Lab Status: Final result Specimen: Blood from Arm, Right Updated: 11/04/24 1622     WBC 8.87 Thousand/uL      RBC 5.14 Million/uL      Hemoglobin 15.7 g/dL      Hematocrit 45.1 %      MCV 88 fL      MCH 30.5 pg      MCHC 34.8 g/dL      RDW 11.9 %      MPV 9.5 fL      Platelets 277 Thousands/uL      nRBC 0 /100 WBCs      Segmented % 65 %      Immature Grans % 0 %      Lymphocytes % 26 %      Monocytes % 8 %      Eosinophils Relative 1 %      Basophils Relative 0 %      Absolute Neutrophils 5.76 Thousands/µL      Absolute  Immature Grans 0.01 Thousand/uL      Absolute Lymphocytes 2.30 Thousands/µL      Absolute Monocytes 0.68 Thousand/µL      Eosinophils Absolute 0.09 Thousand/µL      Basophils Absolute 0.03 Thousands/µL             CT abdomen pelvis with contrast   Final Interpretation by Hussain Mcgarry DO (11/04 1842)   No tumor recurrence or metastatic renal malignancy identified.      Possible cystitis. Please correlate with urinalysis            Workstation performed: [id number]             Procedures    ED Medication and Procedure Management   None     There are no discharge medications for this patient.      ED SEPSIS DOCUMENTATION   Time reflects when diagnosis was documented in both MDM as applicable and the Disposition within this note       Time User Action Codes Description Comment    11/4/2024  6:58 PM Mandy Prieto [R10.9] Flank pain     11/4/2024  6:58 PM Mandy Prieto [Z90.5] History of partial nephrectomy                  Mandy Prieto DO  11/04/24 1945

## 2024-11-06 NOTE — ED ATTENDING ATTESTATION
11/4/2024  I, Fay Rai MD, saw and evaluated the patient. I have discussed the patient with the resident/non-physician practitioner and agree with the resident's/non-physician practitioner's findings, Plan of Care, and MDM as documented in the resident's/non-physician practitioner's note, except where noted. All available labs and Radiology studies were reviewed.  I was present for key portions of any procedure(s) performed by the resident/non-physician practitioner and I was immediately available to provide assistance.       At this point I agree with the current assessment done in the Emergency Department.  I have conducted an independent evaluation of this patient a history and physical is as follows:    74-year-old male presenting to the ER with lower back pain burning.  History of testicular cancer with mets to kidney.  Has reported recent weight loss.  No dysuria.  No fevers or chills.  No abdominal pain.  No chest pain or trouble breathing.  No nausea vomiting.    On exam lower back pain noted.  Too low for PE, also low wells and no perc.   Differential includes muscular pain versus renal colic versus pyelo versus malignancy.  Agree with CT, check kidney function, CBC, urine    ED Course         Critical Care Time  Procedures

## 2024-11-11 ENCOUNTER — OFFICE VISIT (OUTPATIENT)
Dept: UROLOGY | Facility: CLINIC | Age: 34
End: 2024-11-11
Payer: COMMERCIAL

## 2024-11-11 VITALS
TEMPERATURE: 97.4 F | WEIGHT: 149 LBS | RESPIRATION RATE: 16 BRPM | DIASTOLIC BLOOD PRESSURE: 70 MMHG | OXYGEN SATURATION: 98 % | HEIGHT: 72 IN | BODY MASS INDEX: 20.18 KG/M2 | HEART RATE: 89 BPM | SYSTOLIC BLOOD PRESSURE: 120 MMHG

## 2024-11-11 DIAGNOSIS — R10.9 FLANK PAIN: ICD-10-CM

## 2024-11-11 DIAGNOSIS — C80.1 GERM CELL CANCER (HCC): Primary | ICD-10-CM

## 2024-11-11 DIAGNOSIS — Z90.5 HISTORY OF PARTIAL NEPHRECTOMY: ICD-10-CM

## 2024-11-11 LAB
POST-VOID RESIDUAL VOLUME, ML POC: 15 ML
SL AMB  POCT GLUCOSE, UA: NORMAL
SL AMB LEUKOCYTE ESTERASE,UA: NORMAL
SL AMB POCT BILIRUBIN,UA: NORMAL
SL AMB POCT BLOOD,UA: NORMAL
SL AMB POCT CLARITY,UA: CLEAR
SL AMB POCT COLOR,UA: YELLOW
SL AMB POCT KETONES,UA: NORMAL
SL AMB POCT NITRITE,UA: NORMAL
SL AMB POCT PH,UA: 6.5
SL AMB POCT SPECIFIC GRAVITY,UA: 1.01
SL AMB POCT URINE PROTEIN: 15
SL AMB POCT UROBILINOGEN: 0.2

## 2024-11-11 PROCEDURE — 99213 OFFICE O/P EST LOW 20 MIN: CPT | Performed by: PHYSICIAN ASSISTANT

## 2024-11-11 PROCEDURE — 81002 URINALYSIS NONAUTO W/O SCOPE: CPT | Performed by: PHYSICIAN ASSISTANT

## 2024-11-11 PROCEDURE — 51798 US URINE CAPACITY MEASURE: CPT | Performed by: PHYSICIAN ASSISTANT

## 2024-11-11 NOTE — PROGRESS NOTES
11/11/2024      Chief Complaint   Patient presents with    Follow-up     Germ cell cancer         Assessment and Plan    34 y.o. male     1. Flank pain  2. Bladder wall thickening  3. History of Germ Cell tumor s/p left partial nephrectomy in 2013  4. Weight loss  5. Bone pain   - UA today negative for infection or blood  - PVR today 15 mL  - CT abdomen pelvis w contrast (11/4/24) showing no recurrent or metastatic disease. Does show bladder thickening   -Discussed recommendation to have CT chest abdomen pelvis w wo contrast for continued surveillance  - Follow up for cystoscopy to evaluate bladder thickening   - Call with any questions or concerns in the meantime  - All questions answered; patient understands and agrees with plan       History of Present Illness  Igor Funez is a 34 y.o. 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     Patient has been lost to follow up. 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 Patient was last seen in the office in January 2024 for hydrocele and has been lost to follow up. Patient was seen in ER last week for flank pain, weight loss, bone pain. CT scan showing no new or metastatic disease. Did show bladder wall thickening and urine testing at that time negative. Denies gross hematuria, dysuria, fever. Patient has not had recent chest imaging.     Review of Systems   Constitutional:  Negative for activity change, appetite change, chills and fever.   HENT:  Negative for congestion and trouble swallowing.    Respiratory:  Negative for cough and shortness of breath.    Cardiovascular:  Negative for chest pain, palpitations and leg swelling.   Gastrointestinal:  Negative for abdominal pain, constipation, diarrhea, nausea and vomiting.   Genitourinary:  Negative for  difficulty urinating, dysuria, flank pain, frequency, hematuria and urgency.   Musculoskeletal:  Negative for back pain and gait problem.   Skin:  Negative for wound.   Allergic/Immunologic: Negative for immunocompromised state.   Neurological:  Negative for dizziness and syncope.   Hematological:  Does not bruise/bleed easily.   Psychiatric/Behavioral:  Negative for confusion.    All other systems reviewed and are negative.          AUA SYMPTOM SCORE      Flowsheet Row Most Recent Value   AUA SYMPTOM SCORE    How often have you had a sensation of not emptying your bladder completely after you finished urinating? 3 (P)     How often have you had to urinate again less than two hours after you finished urinating? 3 (P)     How often have you found you stopped and started again several times when you urinate? 4 (P)     How often have you found it difficult to postpone urination? 1 (P)     How often have you had a weak urinary stream? 0 (P)     How often have you had to push or strain to begin urination? 0 (P)     How many times did you most typically get up to urinate from the time you went to bed at night until the time you got up in the morning? 0 (P)     Quality of Life: If you were to spend the rest of your life with your urinary condition just the way it is now, how would you feel about that? 2 (P)     AUA SYMPTOM SCORE 11 (P)               Vitals  Vitals:    11/11/24 1511   BP: 120/70   Pulse: 89   Resp: 16   Temp: (!) 97.4 °F (36.3 °C)   TempSrc: Temporal   SpO2: 98%   Weight: 67.6 kg (149 lb)   Height: 6' (1.829 m)       Physical Exam  Constitutional:       General: He is not in acute distress.     Appearance: Normal appearance. He is not ill-appearing, toxic-appearing or diaphoretic.   HENT:      Head: Normocephalic.      Nose: No congestion.   Eyes:      General: No scleral icterus.        Right eye: No discharge.         Left eye: No discharge.      Conjunctiva/sclera: Conjunctivae normal.      Pupils:  Pupils are equal, round, and reactive to light.   Pulmonary:      Effort: Pulmonary effort is normal.   Musculoskeletal:      Cervical back: Normal range of motion.   Skin:     General: Skin is warm and dry.      Coloration: Skin is not jaundiced or pale.      Findings: No bruising, erythema, lesion or rash.   Neurological:      General: No focal deficit present.      Mental Status: He is alert and oriented to person, place, and time. Mental status is at baseline.      Gait: Gait normal.   Psychiatric:         Mood and Affect: Mood normal.         Behavior: Behavior normal.         Thought Content: Thought content normal.         Judgment: Judgment normal.           Past History  Past Medical History:   Diagnosis Date    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HCC)     Renal disorder      Social History     Socioeconomic History    Marital status: /Civil Union     Spouse name: None    Number of children: None    Years of education: None    Highest education level: None   Occupational History    None   Tobacco Use    Smoking status: Never    Smokeless tobacco: Current     Types: Chew   Vaping Use    Vaping status: Never Used   Substance and Sexual Activity    Alcohol use: Yes    Drug use: No    Sexual activity: Yes     Partners: Female   Other Topics Concern    None   Social History Narrative    None     Social Determinants of Health     Financial Resource Strain: Not on file   Food Insecurity: Not on file   Transportation Needs: Not on file   Physical Activity: Not on file   Stress: Not on file   Social Connections: Not on file   Intimate Partner Violence: Not on file   Housing Stability: Not on file     Social History     Tobacco Use   Smoking Status Never   Smokeless Tobacco Current    Types: Chew     Family History   Problem Relation Age of Onset    No Known Problems Father     No Known Problems Mother        The following portions of the patient's history were reviewed and updated as appropriate: allergies, current medications,  "past medical history, past social history, past surgical history and problem list.    Results  Recent Results (from the past 1 hour(s))   POCT urine dip    Collection Time: 11/11/24  3:17 PM   Result Value Ref Range    LEUKOCYTE ESTERASE,UA -     NITRITE,UA -     SL AMB POCT UROBILINOGEN 0.2     POCT URINE PROTEIN 15      PH,UA 6.5     BLOOD,UA -     SPECIFIC GRAVITY,UA 1.010     KETONES,UA -     BILIRUBIN,UA -     GLUCOSE, UA -      COLOR,UA yellow     CLARITY,UA clear    POCT Measure PVR    Collection Time: 11/11/24  3:19 PM   Result Value Ref Range    POST-VOID RESIDUAL VOLUME, ML POC 15 mL   ]  No results found for: \"PSA\"  Lab Results   Component Value Date    GLUCOSE 69 01/15/2015    CALCIUM 9.5 11/04/2024     (L) 01/15/2015    K 3.6 11/04/2024    CO2 27 11/04/2024     11/04/2024    BUN 9 11/04/2024    CREATININE 1.16 11/04/2024     Lab Results   Component Value Date    WBC 8.87 11/04/2024    HGB 15.7 11/04/2024    HCT 45.1 11/04/2024    MCV 88 11/04/2024     11/04/2024       Rocio Barron PA-C  "

## 2024-11-12 ENCOUNTER — TELEPHONE (OUTPATIENT)
Dept: UROLOGY | Facility: CLINIC | Age: 34
End: 2024-11-12

## 2024-11-12 NOTE — TELEPHONE ENCOUNTER
Needs cysto for    Flank pain  2. Bladder wall thickening  3. History of Germ Cell tumor s/p left partial nephrectomy in 2013  4. Weight loss  5. Bone pain   Scheduled for 12/19/24 at 8 AM  will call to confirm

## 2024-12-05 ENCOUNTER — HOSPITAL ENCOUNTER (OUTPATIENT)
Dept: CT IMAGING | Facility: HOSPITAL | Age: 34
End: 2024-12-05
Payer: COMMERCIAL

## 2024-12-05 DIAGNOSIS — C80.1 GERM CELL CANCER (HCC): ICD-10-CM

## 2024-12-05 PROCEDURE — 71260 CT THORAX DX C+: CPT

## 2024-12-05 PROCEDURE — 74178 CT ABD&PLV WO CNTR FLWD CNTR: CPT

## 2024-12-05 RX ADMIN — IOHEXOL 100 ML: 350 INJECTION, SOLUTION INTRAVENOUS at 15:35

## 2024-12-09 ENCOUNTER — PATIENT MESSAGE (OUTPATIENT)
Dept: UROLOGY | Facility: CLINIC | Age: 34
End: 2024-12-09

## 2024-12-11 ENCOUNTER — TELEPHONE (OUTPATIENT)
Age: 34
End: 2024-12-11

## 2024-12-11 ENCOUNTER — RESULTS FOLLOW-UP (OUTPATIENT)
Dept: UROLOGY | Facility: CLINIC | Age: 34
End: 2024-12-11

## 2024-12-11 NOTE — TELEPHONE ENCOUNTER
Radiology Test Results - Radiology Calling with report update - CT chest w ct abdomen pelvis w wo contrast     Pt under care of: Rocio Barron    Imaging Completed: 12/5/24    Significant Findings - Please review

## 2024-12-19 ENCOUNTER — TELEPHONE (OUTPATIENT)
Age: 34
End: 2024-12-19

## 2024-12-19 ENCOUNTER — DOCUMENTATION (OUTPATIENT)
Dept: HEMATOLOGY ONCOLOGY | Facility: CLINIC | Age: 34
End: 2024-12-19

## 2024-12-19 ENCOUNTER — PROCEDURE VISIT (OUTPATIENT)
Dept: UROLOGY | Facility: CLINIC | Age: 34
End: 2024-12-19
Payer: COMMERCIAL

## 2024-12-19 ENCOUNTER — PATIENT OUTREACH (OUTPATIENT)
Dept: HEMATOLOGY ONCOLOGY | Facility: CLINIC | Age: 34
End: 2024-12-19

## 2024-12-19 VITALS
TEMPERATURE: 97.6 F | SYSTOLIC BLOOD PRESSURE: 116 MMHG | OXYGEN SATURATION: 100 % | HEIGHT: 72 IN | WEIGHT: 152 LBS | BODY MASS INDEX: 20.59 KG/M2 | HEART RATE: 84 BPM | DIASTOLIC BLOOD PRESSURE: 76 MMHG

## 2024-12-19 DIAGNOSIS — C80.1 GERM CELL CANCER (HCC): ICD-10-CM

## 2024-12-19 DIAGNOSIS — R10.9 FLANK PAIN: Primary | ICD-10-CM

## 2024-12-19 DIAGNOSIS — N43.2 OTHER HYDROCELE: ICD-10-CM

## 2024-12-19 PROCEDURE — 99214 OFFICE O/P EST MOD 30 MIN: CPT | Performed by: UROLOGY

## 2024-12-19 NOTE — PROGRESS NOTES
PN please retrieve outside records.     Referral received/ Chart reviewed for work up completed   Referral to:hematology oncology   Dx:nonseminomatous germ cell     history of a nonseminomatous germ cell tumor. He states that he never did orchiectomy. He was found to have retroperitoneal disease as well as disease in his left kidney. He was treated at [facility]. First he had upfront chemotherapy followed by an RPLND with concomitant left partial nephrectomy.     Imaging completed: [x][facility] []Other:   [] PET/CT   [] MRI   [x] CT   [] US   [] Mammo   [] Bone scan   [] N/A    Pathology completed: [][facility] [x]Other:   [facility] pathology report   Date:  8/2/ 2013   Location:   []N/A    [facility] []Other: [facility] most recent oncology    [] Genomic report   [] Genetic testing results   [] Office Note - most recent [facility] in media   [] Procedure/ Operative note   [] Lab results   [x] N/A      [] Radiation Oncology records retrieval needed (PN to route to rad/onc clerical pool once scheduled)  Date:  Location:       Referring physician:   Lavell

## 2024-12-19 NOTE — LETTER
2024     Aline Matthews, LENO    Patient: Igor Funez   YOB: 1990   Date of Visit: 2024       Dear Dr. Matthews:    Thank you for referring Igor Funez to me for evaluation. Below are my notes for this consultation.    If you have questions, please do not hesitate to call me. I look forward to following your patient along with you.         Sincerely,        Christian Monte MD        CC: MD Christian Orozco MD  2024  8:20 AM  Sign when Signing Visit  Name: Igor Funez      : 1990      MRN: [id number]  Encounter Provider: Christian Monte MD  Encounter Date: 2024   Encounter department: [facility] [place]  :  Assessment & Plan  Flank pain         Other hydrocele    Orders:  •  US scrotum and testicles; Future    Germ cell cancer (HCC)    Orders:  •  Ambulatory Referral to Hematology / Oncology; Future    Impression: Nonseminomatous germ cell tumor, status post chemotherapy followed by RPLND, left lung nodule, large left hydrocele    Plan: First we discussed his CT scan results showing a 7 mm lung nodule.  The patient should have a referral back to medical oncology for evaluation of the left lung nodule.  I recommend repeating a scrotal ultrasound to monitor the left testicle as his testicle was never removed.  He is considering hydrocelectomy, however, he does very physical and heavy labor and I informed him he would need off from work at least 2 to 3 weeks which may be challenging for him at this time.  Follow-up with urology as recommended after his referral to oncology and his repeat scrotal ultrasound.    History of Present Illness    Igor is a 34-year-old male with a complicated urologic history.  He has a history of a nonseminomatous germ cell tumor.  He states that he never did orchiectomy.  He was found to have retroperitoneal disease as well as disease in his left kidney.  He was treated  at [facility].  First he had upfront chemotherapy followed by an RPLND with concomitant left partial nephrectomy.  He is unaware of pathology.  He has a left-sided hydrocele.  We discussed the possibility of a burned-out testicular tumor.  Just under 1 year ago he had a scrotal ultrasound showing both testes to be normal but with a large left-sided hydrocele.  All tumor markers from 2024 were normal.  AUA SYMPTOM SCORE      Flowsheet Row Most Recent Value   AUA SYMPTOM SCORE    How often have you had a sensation of not emptying your bladder completely after you finished urinating? 2 (P)     How often have you had to urinate again less than two hours after you finished urinating? 4 (P)     How often have you found you stopped and started again several times when you urinate? 3 (P)     How often have you found it difficult to postpone urination? 3 (P)     How often have you had a weak urinary stream? 0 (P)     How often have you had to push or strain to begin urination? 3 (P)     How many times did you most typically get up to urinate from the time you went to bed at night until the time you got up in the morning? 1 (P)     Quality of Life: If you were to spend the rest of your life with your urinary condition just the way it is now, how would you feel about that? 1 (P)     AUA SYMPTOM SCORE 16 (P)            Review of Systems       Objective  /76 (BP Location: Left arm, Patient Position: Standing, Cuff Size: Standard)   Pulse 84   Temp 97.6 °F (36.4 °C) (Temporal)   Ht 6' (1.829 m)   Wt 68.9 kg (152 lb)   SpO2 100%   BMI 20.61 kg/m²     Physical Exam on examination he is in no acute distress.  His abdomen is soft nontender nondistended.  A well-healed midline scar from xiphoid to pubic symphysis is noted.  Right testicle is normal.  A moderate to large left hydrocele is appreciated.  The left testicle is nonpalpable.  Skin is warm.  Extremities without edema.  Neurologic is grossly intact  "and nonfocal.  Gait normal.  Affect normal.    Results  No results found for: \"PSA\"  Lab Results   Component Value Date    GLUCOSE 69 01/15/2015    CALCIUM 9.5 11/04/2024     (L) 01/15/2015    K 3.6 11/04/2024    CO2 27 11/04/2024     11/04/2024    BUN 9 11/04/2024    CREATININE 1.16 11/04/2024     Lab Results   Component Value Date    WBC 8.87 11/04/2024    HGB 15.7 11/04/2024    HCT 45.1 11/04/2024    MCV 88 11/04/2024     11/04/2024       Office Urine Dip  No results found for this or any previous visit (from the past hour).]       "

## 2024-12-19 NOTE — PROGRESS NOTES
Name: Igor Funez      : 1990      MRN: [id number]  Encounter Provider: Christian Monte MD  Encounter Date: 2024   Encounter department: [facility] [place]  :  Assessment & Plan  Flank pain         Other hydrocele    Orders:  •  US scrotum and testicles; Future    Germ cell cancer (HCC)    Orders:  •  Ambulatory Referral to Hematology / Oncology; Future    Impression: Nonseminomatous germ cell tumor, status post chemotherapy followed by RPLND, left lung nodule, large left hydrocele    Plan: First we discussed his CT scan results showing a 7 mm lung nodule.  The patient should have a referral back to medical oncology for evaluation of the left lung nodule.  I recommend repeating a scrotal ultrasound to monitor the left testicle as his testicle was never removed.  He is considering hydrocelectomy, however, he does very physical and heavy labor and I informed him he would need off from work at least 2 to 3 weeks which may be challenging for him at this time.  Follow-up with urology as recommended after his referral to oncology and his repeat scrotal ultrasound.    History of Present Illness     Igor is a 34-year-old male with a complicated urologic history.  He has a history of a nonseminomatous germ cell tumor.  He states that he never did orchiectomy.  He was found to have retroperitoneal disease as well as disease in his left kidney.  He was treated at [facility].  First he had upfront chemotherapy followed by an RPLND with concomitant left partial nephrectomy.  He is unaware of pathology.  He has a left-sided hydrocele.  We discussed the possibility of a burned-out testicular tumor.  Just under 1 year ago he had a scrotal ultrasound showing both testes to be normal but with a large left-sided hydrocele.  All tumor markers from  were normal.  AUA SYMPTOM SCORE      Flowsheet Row Most Recent Value   AUA SYMPTOM SCORE    How often have you had a sensation  "of not emptying your bladder completely after you finished urinating? 2 (P)     How often have you had to urinate again less than two hours after you finished urinating? 4 (P)     How often have you found you stopped and started again several times when you urinate? 3 (P)     How often have you found it difficult to postpone urination? 3 (P)     How often have you had a weak urinary stream? 0 (P)     How often have you had to push or strain to begin urination? 3 (P)     How many times did you most typically get up to urinate from the time you went to bed at night until the time you got up in the morning? 1 (P)     Quality of Life: If you were to spend the rest of your life with your urinary condition just the way it is now, how would you feel about that? 1 (P)     AUA SYMPTOM SCORE 16 (P)            Review of Systems       Objective   /76 (BP Location: Left arm, Patient Position: Standing, Cuff Size: Standard)   Pulse 84   Temp 97.6 °F (36.4 °C) (Temporal)   Ht 6' (1.829 m)   Wt 68.9 kg (152 lb)   SpO2 100%   BMI 20.61 kg/m²     Physical Exam on examination he is in no acute distress.  His abdomen is soft nontender nondistended.  A well-healed midline scar from xiphoid to pubic symphysis is noted.  Right testicle is normal.  A moderate to large left hydrocele is appreciated.  The left testicle is nonpalpable.  Skin is warm.  Extremities without edema.  Neurologic is grossly intact and nonfocal.  Gait normal.  Affect normal.    Results  No results found for: \"PSA\"  Lab Results   Component Value Date    GLUCOSE 69 01/15/2015    CALCIUM 9.5 11/04/2024     (L) 01/15/2015    K 3.6 11/04/2024    CO2 27 11/04/2024     11/04/2024    BUN 9 11/04/2024    CREATININE 1.16 11/04/2024     Lab Results   Component Value Date    WBC 8.87 11/04/2024    HGB 15.7 11/04/2024    HCT 45.1 11/04/2024    MCV 88 11/04/2024     11/04/2024       Office Urine Dip  No results found for this or any previous visit " (from the past hour).]

## 2024-12-19 NOTE — TELEPHONE ENCOUNTER
Patient scheduled for 1/14/25 with Dr Yoon at 2:00 pm at [facility], patient has to be scheduled with this doctor. This does not fall within the recommended 7 day time frame, please call patient back to assist with finding a sooner appt

## 2024-12-19 NOTE — PROGRESS NOTES
Patient has upcoming appt with PCP. Message sent to Dr. Yoon for any further testing needs.     Oncology Nurse Navigator made call to patient due to referral to provider within [facility]. I spoke with patient about my role as an Oncology Nurse Navigator. I explained how to reach the office for any routine or urgent matters and the availability of patient navigation for non urgent matters. Explained oncology navigation is here to help patients through their journey and to offer assistance with any barriers to care. As a team, we strive to be patient advocates and help navigate healthcare system. Patient aware that medical oncology nursing will be primary contact once consult is complete and patient navigator will continue to offer support through entire journey. Conversation is based on evidence based care to optimize patient outcomes. Emotional support provided throughout conversation.       Evaluated for any barriers to care.   Distress thermometer completed by PN   Genetic testing not indicated  Smoking status verified   Prior cancer and radiation history as noted  Provided contact information for nurse navigator and patient navigator  Verified PCP/insurance on file  Discussed use of My Chart patient uses regularly     Answered questions to pt's satisfaction.  Reviewed upcoming appts. Provided support and provided direct contact information for any questions or concerns.       Future Appointments   Date Time Provider Department Center   12/26/2024  3:20 PM [facility]   1/15/2025  7:30 PM [facility]   2/12/2025  3:20 PM [facility]

## 2024-12-20 ENCOUNTER — DOCUMENTATION (OUTPATIENT)
Dept: HEMATOLOGY ONCOLOGY | Facility: CLINIC | Age: 34
End: 2024-12-20

## 2024-12-20 ENCOUNTER — PATIENT OUTREACH (OUTPATIENT)
Dept: HEMATOLOGY ONCOLOGY | Facility: CLINIC | Age: 34
End: 2024-12-20

## 2024-12-20 NOTE — PROGRESS NOTES
Intake received, chart reviewed for need of external records.  Consulting: Dr. Yoon  Scheduled on 1/14/25  Dx: Testicular cancer    Pathology slides requested:   From [facility]  phone [phone], via fax [phone]  Accession # [id number]  Slides will be sent directly to [facility] Pathology lab at [address] [place] [address].     Looking for path report

## 2024-12-24 ENCOUNTER — PATIENT OUTREACH (OUTPATIENT)
Dept: HEMATOLOGY ONCOLOGY | Facility: CLINIC | Age: 34
End: 2024-12-24

## 2024-12-24 DIAGNOSIS — Z85.47 HISTORY OF TESTICULAR CANCER: Primary | ICD-10-CM

## 2024-12-24 NOTE — PROGRESS NOTES
Patient called wondering if any other testing is needed prior to oncology consult. Per Dr. Yoon, no further testing necessary. Tumor markers repeated per protocol. Patient verbalized understanding.

## 2024-12-26 ENCOUNTER — OFFICE VISIT (OUTPATIENT)
Dept: FAMILY MEDICINE CLINIC | Facility: CLINIC | Age: 34
End: 2024-12-26
Payer: COMMERCIAL

## 2024-12-26 VITALS
TEMPERATURE: 99.2 F | WEIGHT: 160 LBS | BODY MASS INDEX: 21.67 KG/M2 | OXYGEN SATURATION: 97 % | DIASTOLIC BLOOD PRESSURE: 82 MMHG | SYSTOLIC BLOOD PRESSURE: 136 MMHG | HEART RATE: 92 BPM | HEIGHT: 72 IN

## 2024-12-26 DIAGNOSIS — Z00.00 ROUTINE PHYSICAL EXAMINATION: Primary | ICD-10-CM

## 2024-12-26 DIAGNOSIS — C48.0 GERM CELL TUMOR OF RETROPERITONEUM (HCC): ICD-10-CM

## 2024-12-26 DIAGNOSIS — N43.2 OTHER HYDROCELE: ICD-10-CM

## 2024-12-26 DIAGNOSIS — Z13.220 LIPID SCREENING: ICD-10-CM

## 2024-12-26 PROCEDURE — 99385 PREV VISIT NEW AGE 18-39: CPT

## 2024-12-26 NOTE — ASSESSMENT & PLAN NOTE
Seen on recent CT -- has US scrotum scheduled for further evaluation   Following with urology for treatment

## 2024-12-26 NOTE — PROGRESS NOTES
"Adult Annual Physical  Name: Igor Fnuez      : 1990      MRN: [id number]  Encounter Provider: Liliane Cifuentes PA-C  Encounter Date: 2024   Encounter department: [facility]    Assessment & Plan  Routine physical examination  Presents to establish care and for annual physical  Physical exam unremarkable  Ordered routine labs to be completed  Defers influenza vaccine today   Not due for any preventative screenings at this time  Follow up in one year for next annual physical, sooner as needed    Orders:    Comprehensive metabolic panel; Future    Lipid screening    Orders:    Lipid panel; Future    Germ cell tumor of retroperitoneum (HCC)  Patient reports diagnosed 10+ years ago, s/p partial nephrectomy  He follows routinely with  urology and oncology for management  Has appointment with oncology on 25 due to new pulmonary nodule on recent CT        Other hydrocele  Seen on recent CT -- has US scrotum scheduled for further evaluation   Following with urology for treatment        Immunizations and preventive care screenings were discussed with patient today. Appropriate education was printed on patient's after visit summary.    Counseling:  Dental Health: discussed importance of regular tooth brushing, flossing, and dental visits.  Exercise: the importance of regular exercise/physical activity was discussed. Recommend exercise 3-5 times per week for at least 30 minutes.          History of Present Illness       Patient presents to establish care and for routine physical.   Reports he has never had a PCP in the past.     Patient has been following with urology and oncology. He reports at the age of 22 he was diagnosed with a germ cell tumor of the retroperitoneum and had a partial nephrectomy.   Recently, a CT scan of chest/abdomen showed \"Nonopacification of upper third of the left proximal ureter, limiting assessment. Otherwise no suspicious focal renal or urothelial mass " "lesion.   Stable appearance of partial left nephrectomy and postsurgical changes in the retroperitoneum without CT findings to suggest local recurrence.   A 0.7 x 0.4 cm groundglass pulmonary nodule in the left upper lobe (4/54). Findings can be postinflammatory or less likely, neoplastic in origin. Follow-up with CT chest in 3 months is recommended to assess for stability/resolution.   A small left hydrocele. Nonemergent scrotal ultrasound is recommended for complete assessment.\"    He has an US of the scrotum scheduled for further evaluation of the hydrocele through urology and has an appointment with oncology on 1/14/25 for further evaluation of new pulmonary nodule.     Otherwise he denies any chronic medical conditions does not take any daily medication. Denies tobacco use.     Adult Annual Physical:  Patient presents for annual physical.     Diet and Physical Activity:  - Diet/Nutrition: well balanced diet and consuming 3-5 servings of fruits/vegetables daily.  - Exercise: walking.    Depression Screening:  - PHQ-2 Score: 0    General Health:  - Sleep: sleeps well.  - Hearing: normal hearing bilateral ears.  - Vision: goes for regular eye exams and wears contacts.  - Dental: regular dental visits and brushes teeth twice daily.     Health:    - Urinary symptoms: none.     Review of Systems   Constitutional:  Negative for chills, diaphoresis and fever.   Respiratory:  Negative for cough, chest tightness, shortness of breath and wheezing.    Cardiovascular:  Negative for chest pain and palpitations.   Gastrointestinal:  Negative for abdominal pain, blood in stool, constipation, diarrhea, nausea and vomiting.   Neurological:  Negative for dizziness, light-headedness and headaches.     Medical History Reviewed by provider this encounter:     .  Past Medical History   Past Medical History:   Diagnosis Date    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HCC)     Renal disorder      Past Surgical History:   Procedure Laterality Date    KIDNEY " SURGERY      SHOULDER SURGERY       Family History   Problem Relation Age of Onset    No Known Problems Father     No Known Problems Mother       reports that he has never smoked. His smokeless tobacco use includes chew. He reports current alcohol use. He reports that he does not use drugs.  No current outpatient medications on file prior to visit.     No current facility-administered medications on file prior to visit.   No Known Allergies   No current outpatient medications on file prior to visit.     No current facility-administered medications on file prior to visit.      Social History     Tobacco Use    Smoking status: Never    Smokeless tobacco: Current     Types: Chew   Vaping Use    Vaping status: Never Used   Substance and Sexual Activity    Alcohol use: Yes    Drug use: No    Sexual activity: Yes     Partners: Female       Objective   /82   Pulse 92   Temp 99.2 °F (37.3 °C)   Ht 6' (1.829 m)   Wt 72.6 kg (160 lb)   SpO2 97%   BMI 21.70 kg/m²     Physical Exam  Vitals reviewed.   Constitutional:       General: He is not in acute distress.     Appearance: Normal appearance. He is not ill-appearing or diaphoretic.   HENT:      Head: Normocephalic and atraumatic.      Right Ear: Tympanic membrane, ear canal and external ear normal. There is no impacted cerumen.      Left Ear: Tympanic membrane, ear canal and external ear normal. There is no impacted cerumen.      Nose: Nose normal. No congestion or rhinorrhea.      Mouth/Throat:      Mouth: Mucous membranes are moist.      Pharynx: Oropharynx is clear. No oropharyngeal exudate or posterior oropharyngeal erythema.   Eyes:      General:         Right eye: No discharge.         Left eye: No discharge.      Conjunctiva/sclera: Conjunctivae normal.   Cardiovascular:      Rate and Rhythm: Normal rate and regular rhythm.      Pulses: Normal pulses.      Heart sounds: Normal heart sounds. No murmur heard.  Pulmonary:      Effort: Pulmonary effort is  normal. No respiratory distress.      Breath sounds: Normal breath sounds. No wheezing, rhonchi or rales.   Abdominal:      General: Bowel sounds are normal. There is no distension.      Palpations: Abdomen is soft.      Tenderness: There is no abdominal tenderness.   Musculoskeletal:         General: Normal range of motion.      Cervical back: Normal range of motion and neck supple.      Right lower leg: No edema.      Left lower leg: No edema.   Lymphadenopathy:      Cervical: No cervical adenopathy.   Skin:     General: Skin is warm.   Neurological:      General: No focal deficit present.      Mental Status: He is alert.      Gait: Gait normal.   Psychiatric:         Mood and Affect: Mood normal.

## 2024-12-26 NOTE — ASSESSMENT & PLAN NOTE
Patient reports diagnosed 10+ years ago, s/p partial nephrectomy  He follows routinely with  urology and oncology for management  Has appointment with oncology on 1/14/25 due to new pulmonary nodule on recent CT

## 2025-01-09 ENCOUNTER — DOCUMENTATION (OUTPATIENT)
Dept: HEMATOLOGY ONCOLOGY | Facility: CLINIC | Age: 35
End: 2025-01-09

## 2025-01-09 NOTE — PROGRESS NOTES
Call placed to [facility] pathology department. They stated they never received fax. Path slide request sent to [phone]

## 2025-01-10 ENCOUNTER — APPOINTMENT (OUTPATIENT)
Dept: LAB | Facility: MEDICAL CENTER | Age: 35
End: 2025-01-10
Payer: COMMERCIAL

## 2025-01-10 DIAGNOSIS — C80.1 GERM CELL CANCER (HCC): ICD-10-CM

## 2025-01-10 DIAGNOSIS — Z13.220 LIPID SCREENING: ICD-10-CM

## 2025-01-10 DIAGNOSIS — Z00.00 ROUTINE PHYSICAL EXAMINATION: ICD-10-CM

## 2025-01-10 DIAGNOSIS — Z85.47 HISTORY OF TESTICULAR CANCER: ICD-10-CM

## 2025-01-10 LAB
AFP-TM SERPL-MCNC: 3.47 NG/ML (ref 0–9)
ALBUMIN SERPL BCG-MCNC: 4.5 G/DL (ref 3.5–5)
ALP SERPL-CCNC: 59 U/L (ref 34–104)
ALT SERPL W P-5'-P-CCNC: 20 U/L (ref 7–52)
ANION GAP SERPL CALCULATED.3IONS-SCNC: 7 MMOL/L (ref 4–13)
AST SERPL W P-5'-P-CCNC: 17 U/L (ref 13–39)
BILIRUB SERPL-MCNC: 1.07 MG/DL (ref 0.2–1)
BUN SERPL-MCNC: 18 MG/DL (ref 5–25)
CALCIUM SERPL-MCNC: 9.4 MG/DL (ref 8.4–10.2)
CHLORIDE SERPL-SCNC: 103 MMOL/L (ref 96–108)
CHOLEST SERPL-MCNC: 214 MG/DL (ref ?–200)
CO2 SERPL-SCNC: 28 MMOL/L (ref 21–32)
CREAT SERPL-MCNC: 1.17 MG/DL (ref 0.6–1.3)
GFR SERPL CREATININE-BSD FRML MDRD: 82 ML/MIN/1.73SQ M
GLUCOSE P FAST SERPL-MCNC: 83 MG/DL (ref 65–99)
HCG-TM SERPL-SCNC: <0.6 MLU/ML
HDLC SERPL-MCNC: 52 MG/DL
LDLC SERPL CALC-MCNC: 142 MG/DL (ref 0–100)
NONHDLC SERPL-MCNC: 162 MG/DL
POTASSIUM SERPL-SCNC: 4 MMOL/L (ref 3.5–5.3)
PROT SERPL-MCNC: 7 G/DL (ref 6.4–8.4)
SODIUM SERPL-SCNC: 138 MMOL/L (ref 135–147)
TRIGL SERPL-MCNC: 98 MG/DL (ref ?–150)

## 2025-01-10 PROCEDURE — 80053 COMPREHEN METABOLIC PANEL: CPT

## 2025-01-10 PROCEDURE — 84702 CHORIONIC GONADOTROPIN TEST: CPT

## 2025-01-10 PROCEDURE — 36415 COLL VENOUS BLD VENIPUNCTURE: CPT

## 2025-01-10 PROCEDURE — 80061 LIPID PANEL: CPT

## 2025-01-10 PROCEDURE — 82105 ALPHA-FETOPROTEIN SERUM: CPT

## 2025-01-12 ENCOUNTER — RESULTS FOLLOW-UP (OUTPATIENT)
Dept: FAMILY MEDICINE CLINIC | Facility: CLINIC | Age: 35
End: 2025-01-12

## 2025-01-13 ENCOUNTER — DOCUMENTATION (OUTPATIENT)
Dept: HEMATOLOGY ONCOLOGY | Facility: CLINIC | Age: 35
End: 2025-01-13

## 2025-01-14 ENCOUNTER — DOCUMENTATION (OUTPATIENT)
Dept: HEMATOLOGY ONCOLOGY | Facility: CLINIC | Age: 35
End: 2025-01-14

## 2025-01-14 ENCOUNTER — OFFICE VISIT (OUTPATIENT)
Dept: HEMATOLOGY ONCOLOGY | Facility: CLINIC | Age: 35
End: 2025-01-14
Payer: COMMERCIAL

## 2025-01-14 VITALS
SYSTOLIC BLOOD PRESSURE: 116 MMHG | BODY MASS INDEX: 21.67 KG/M2 | DIASTOLIC BLOOD PRESSURE: 70 MMHG | HEART RATE: 90 BPM | RESPIRATION RATE: 18 BRPM | WEIGHT: 160 LBS | OXYGEN SATURATION: 96 % | HEIGHT: 72 IN | TEMPERATURE: 97.8 F

## 2025-01-14 DIAGNOSIS — C48.0 GERM CELL TUMOR OF RETROPERITONEUM (HCC): Primary | ICD-10-CM

## 2025-01-14 DIAGNOSIS — N43.3 HYDROCELE IN ADULT: ICD-10-CM

## 2025-01-14 PROCEDURE — 99204 OFFICE O/P NEW MOD 45 MIN: CPT | Performed by: INTERNAL MEDICINE

## 2025-01-14 NOTE — LETTER
2025     Christian Monte MD  [address]  [address]  [place] [address]    Patient: Igor Funez   YOB: 1990   Date of Visit: 2025       Dear Dr. Monte:    Thank you for referring Igor Funez to me for evaluation. Below are my notes for this consultation.    If you have questions, please do not hesitate to call me. I look forward to following your patient along with you.         Sincerely,        Medhat Yoon MD        CC: No Recipients    Medhat Yoon MD  2025  5:52 PM  Sign when Signing Visit  Name: Igor Funez      : 1990      MRN: [id number]  Encounter Provider: Medhat Yoon MD  Encounter Date: 2025   Encounter department: 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  He received induction cisplatin based combination chemotherapy 4 cycles, followed by a postchemotherapy retroperitoneal lymph node dissection (RPLND) plus left partial nephrectomy. He is unaware of pathology from RPLND. He developed a postoperative left-sided hydrocele. On on 2014 and on 2014 contrast-enhanced CT scan of the chest respectively, showed stable left lower lobe 3 mm pulmonary nodule.  No new nodules were identified at that time.    Currently, Mr. Funez is undergoing surveillance. On 2024 scrotal and testicular ultrasound showed a left hydrocele.  There were no testicular masses. The patient's most recent surveillance cross-sectional imaging studies include a contrast-enhanced CT scan of the abdomen and pelvis on 2024 which did not show recurrent or metastatic disease.  On 2024, contrast-enhanced CT scan of the chest, abdomen and pelvis showed a 0.7 x 0.4 cm groundglass pulmonary nodule in the left  Called patient, rescheduled her 2/1/23 (pt had covid) appt to 2/15/23 , f/u with DANNY ROLON [facility] upper lobe. The patient's most recent serum germ cell tumor biomarkers on January 10, 2025, including serum AFP and hCG were within normal limits.    Differential diagnosis for the patient's sub-centimeter left lung upper lobe nodule include an inflammatory lung infiltrate, scar tissue, or late recurrence of germ cell tumor, specifically a small left lung upper lobe teratoma.  At this point in time, I recommend to continue surveillance.  The patient will have gem cell tumor markers including serum AFP, hCG, and LDH as well as contrast-enhanced CT scan of the chest every 3 months.  If the left lung upper lobe nodule grows to larger than 1 cm, the patient would require surgical resection of this lesion.  Conversely, if the lesion remains stable with a size less than 1 cm in diameter, he will continue surveillance.    Plan:  Continue surveillance, with periodic CT scan of the chest and germ cell tumor serum biomarkers  Contrast-enhanced CT scan of the chest and germ cell tumor biomarkers on March 18, 2025  Return to medical oncology clinic for history and physical exam and to review results of CT scan of the chest as well as gem cell tumor biomarkers on March 25, 2025      Orders:  •  CT chest w contrast; Future  •  LD,Blood; Future  •  AFP tumor marker; Future  •  hCG, quantitative; Future    Hydrocele in adult  The patient will have scrotal ultrasound on January 15, 2025.  I will review results of scrotal ultrasound as soon as available.       The patient understands and agrees with my management recommendations and plan of care. I answered questions to his satisfaction.      The total time spent on this new outpatient consultation was 45 minutes       History of Present Illness   Chief Complaint   Patient presents with   • Follow-up   34-year-old male with retroperitoneal, extragonadal germ cell tumor (GCT) with involvement of the left kidney, diagnosed sometime in 2013.  He received induction cisplatin based  combination chemotherapy 4 cycles, followed by a postchemotherapy retroperitoneal lymph node dissection (RPLND) plus left partial nephrectomy. He is unaware of pathology from RPLND. He developed a postoperative left-sided hydrocele. On on July 23, 2014 and on November 14, 2014 contrast-enhanced CT scan of the chest respectively, showed stable left lower lobe 3 mm pulmonary nodule.  No new nodules were identified at that time.    Currently, the patient is undergoing surveillance. On January 14, 2024 scrotal and testicular ultrasound showed a left hydrocele.  There were no testicular masses. The patient's most recent surveillance cross-sectional imaging studies include a contrast-enhanced CT scan of the abdomen and pelvis on November 11 2024 which did not show recurrent or metastatic disease.  On December 11, 2024, contrast-enhanced CT scan of the chest, abdomen and pelvis showed a 0.7 x 0.4 cm groundglass pulmonary nodule in the left upper lobe.  Radiographic differential diagnosis include postinflammatory changes or less likely a metastatic nodule, given this lesion has been present for more than 10 years. The patient's most recent serum germ cell tumor biomarkers on January 10, 2025, including AFP and hCG were within normal limits.    Today, Mr. Funez does not have new complaints.  He does not have pain.  He denied respiratory symptoms such as cough, sputum production, hemoptysis, dyspnea at rest or with exertion, or chest pain.  He denies new systemic, cardiac, gastrointestinal, genitourinary, musculoskeletal, dermatological, or neurologic symptoms.  He has excellent quality of life.      Pertinent Medical History   01/14/25:     Past Medical History:   Diagnosis Date   • 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HCC)    • Renal disorder      Past Surgical History:   Procedure Laterality Date   • KIDNEY SURGERY     • SHOULDER SURGERY       Family History   Problem Relation Age of Onset   • No Known Problems Father    • No Known Problems  Mother      Social History     Socioeconomic History   • Marital status: /Civil Union     Spouse name: Not on file   • Number of children: Not on file   • Years of education: Not on file   • Highest education level: Not on file   Occupational History   • Not on file   Tobacco Use   • Smoking status: Never   • Smokeless tobacco: Current     Types: Chew   Vaping Use   • Vaping status: Never Used   Substance and Sexual Activity   • Alcohol use: Yes   • Drug use: No   • Sexual activity: Yes     Partners: Female   Other Topics Concern   • Not on file   Social History Narrative   • Not on file     Social Drivers of Health     Financial Resource Strain: Not on file   Food Insecurity: Not on file   Transportation Needs: Not on file   Physical Activity: Not on file   Stress: Not on file   Social Connections: Not on file   Intimate Partner Violence: Not on file   Housing Stability: Not on file       Review of Systems   Constitutional:  Negative for activity change, appetite change, chills, diaphoresis, fatigue, fever and unexpected weight change.   HENT:  Negative for congestion, dental problem, ear discharge, ear pain, facial swelling, hearing loss, mouth sores, nosebleeds, postnasal drip, rhinorrhea, sinus pain, sneezing, sore throat, tinnitus, trouble swallowing and voice change.    Eyes:  Negative for photophobia, pain, discharge, redness, itching and visual disturbance.   Respiratory:  Negative for apnea, cough, choking, chest tightness, shortness of breath, wheezing and stridor.    Cardiovascular:  Negative for chest pain, palpitations and leg swelling.   Gastrointestinal:  Negative for abdominal distention, abdominal pain, anal bleeding, blood in stool, constipation, diarrhea, nausea, rectal pain and vomiting.   Endocrine: Negative for cold intolerance and heat intolerance.   Genitourinary:  Negative for decreased urine volume, difficulty urinating, dysuria, enuresis, flank pain, frequency, hematuria and  urgency.   Musculoskeletal:  Negative for arthralgias, back pain, gait problem, joint swelling, myalgias, neck pain and neck stiffness.   Skin:  Negative for color change, pallor, rash and wound.   Neurological:  Negative for dizziness, tremors, seizures, syncope, facial asymmetry, speech difficulty, weakness, light-headedness, numbness and headaches.   Hematological:  Negative for adenopathy. Does not bruise/bleed easily.   Psychiatric/Behavioral:  Negative for behavioral problems, confusion, dysphoric mood and sleep disturbance. The patient is not nervous/anxious.          Objective   /70 (BP Location: Left arm, Patient Position: Sitting, Cuff Size: Adult)   Pulse 90   Temp 97.8 °F (36.6 °C) (Temporal)   Resp 18   Ht 6' (1.829 m)   Wt 72.6 kg (160 lb)   SpO2 96%   BMI 21.70 kg/m²     Pain Screening:  Pain Score: 0-No pain  ECOG   0  Physical Exam  Constitutional:       Comments: Male patient well-developed, well-nourished without acute respiratory distress   HENT:      Head: Normocephalic and atraumatic.      Nose: Nose normal.      Mouth/Throat:      Mouth: Mucous membranes are moist.      Pharynx: Oropharynx is clear.   Eyes:      Extraocular Movements: Extraocular movements intact.      Conjunctiva/sclera: Conjunctivae normal.      Pupils: Pupils are equal, round, and reactive to light.      Comments: No scleral icterus   Neck:      Comments: No cervical, supraclavicular, axillary, epitrochlear, or inguinal lymphadenopathy.   Cardiovascular:      Rate and Rhythm: Normal rate and regular rhythm.      Pulses: Normal pulses.      Heart sounds: Normal heart sounds.   Pulmonary:      Effort: Pulmonary effort is normal.      Breath sounds: Normal breath sounds.   Abdominal:      General: Bowel sounds are normal.      Palpations: Abdomen is soft.      Comments: Abdomen soft, nontender, nonpainful.  No hepatomegaly.  No splenomegaly.  No rebound tenderness.  No lateral or shifting dullness.  Bowel sounds  present, normal.    Musculoskeletal:         General: Normal range of motion.      Cervical back: Normal range of motion and neck supple.   Skin:     General: Skin is warm and dry.      Capillary Refill: Capillary refill takes less than 2 seconds.   Neurological:      General: No focal deficit present.      Mental Status: He is alert and oriented to person, place, and time. Mental status is at baseline.   Psychiatric:         Mood and Affect: Mood normal.         Behavior: Behavior normal.         Thought Content: Thought content normal.         Judgment: Judgment normal.     Labs: I have reviewed the following labs:  Lab Results   Component Value Date/Time    WBC 8.87 11/04/2024 04:13 PM    RBC 5.14 11/04/2024 04:13 PM    Hemoglobin 15.7 11/04/2024 04:13 PM    Hematocrit 45.1 11/04/2024 04:13 PM    MCV 88 11/04/2024 04:13 PM    MCH 30.5 11/04/2024 04:13 PM    RDW 11.9 11/04/2024 04:13 PM    Platelets 277 11/04/2024 04:13 PM    Segmented % 65 11/04/2024 04:13 PM    Lymphocytes % 26 11/04/2024 04:13 PM    Monocytes % 8 11/04/2024 04:13 PM    Eosinophils Relative 1 11/04/2024 04:13 PM    Basophils Relative 0 11/04/2024 04:13 PM    Immature Grans % 0 11/04/2024 04:13 PM    Absolute Neutrophils 5.76 11/04/2024 04:13 PM     Lab Results   Component Value Date/Time    Potassium 4.0 01/10/2025 06:11 AM    Chloride 103 01/10/2025 06:11 AM    CO2 28 01/10/2025 06:11 AM    BUN 18 01/10/2025 06:11 AM    Creatinine 1.17 01/10/2025 06:11 AM    Glucose, Fasting 83 01/10/2025 06:11 AM    Calcium 9.4 01/10/2025 06:11 AM    AST 17 01/10/2025 06:11 AM    ALT 20 01/10/2025 06:11 AM    Alkaline Phosphatase 59 01/10/2025 06:11 AM    Total Protein 7.0 01/10/2025 06:11 AM    Albumin 4.5 01/10/2025 06:11 AM    Total Bilirubin 1.07 (H) 01/10/2025 06:11 AM    eGFR 82 01/10/2025 06:11 AM     Germ cell tumor serum biomarkers:    Component      Latest Ref Rng 12/2/2021 1/17/2024 1/10/2025   AFP-TUMOR MARKER      0.00 - 9.00 ng/mL 4.5  2.75  3.47       Component      Latest Ref Rng 12/2/2021 1/17/2024 1/10/2025   HCG TUMOR MARKER      <5 mlU/mL <2  <1  <0.6

## 2025-01-14 NOTE — PROGRESS NOTES
Spoke to Joe from Dr Lopez office. Path report will be faxed to his office directly. He is aware and will be looking for it.

## 2025-01-14 NOTE — ASSESSMENT & PLAN NOTE
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  He received induction cisplatin based combination chemotherapy 4 cycles, followed by a postchemotherapy retroperitoneal lymph node dissection (RPLND) plus left partial nephrectomy. He is unaware of pathology from RPLND. He developed a postoperative left-sided hydrocele. On on July 23, 2014 and on November 14, 2014 contrast-enhanced CT scan of the chest respectively, showed stable left lower lobe 3 mm pulmonary nodule.  No new nodules were identified at that time.    Currently, Mr. Funez is undergoing surveillance. On January 14, 2024 scrotal and testicular ultrasound showed a left hydrocele.  There were no testicular masses. The patient's most recent surveillance cross-sectional imaging studies include a contrast-enhanced CT scan of the abdomen and pelvis on November 11 2024 which did not show recurrent or metastatic disease.  On December 11, 2024, contrast-enhanced CT scan of the chest, abdomen and pelvis showed a 0.7 x 0.4 cm groundglass pulmonary nodule in the left upper lobe. The patient's most recent serum germ cell tumor biomarkers on January 10, 2025, including serum AFP and hCG were within normal limits.    Differential diagnosis for the patient's sub-centimeter left lung upper lobe nodule include an inflammatory lung infiltrate, scar tissue, or late recurrence of germ cell tumor, specifically a small left lung upper lobe teratoma.  At this point in time, I recommend to continue surveillance.  The patient will have gem cell tumor markers including serum AFP, hCG, and LDH as well as contrast-enhanced CT scan of the chest every 3 months.  If the left lung upper lobe nodule grows to larger than 1 cm, the patient would require surgical resection of this lesion.  Conversely, if the lesion remains stable with a size less than 1 cm in diameter, he will continue  surveillance.    Plan:  Continue surveillance, with periodic CT scan of the chest and germ cell tumor serum biomarkers  Contrast-enhanced CT scan of the chest and germ cell tumor biomarkers on March 18, 2025  Return to medical oncology clinic for history and physical exam and to review results of CT scan of the chest as well as gem cell tumor biomarkers on March 25, 2025      Orders:    CT chest w contrast; Future    LD,Blood; Future    AFP tumor marker; Future    hCG, quantitative; Future

## 2025-01-14 NOTE — PROGRESS NOTES
[phone] and [phone] were both given to [place]. Joe has still not received report. I spoke with [facility] again and they weill try one more time. Joe made Dr Yoon aware of this issue.

## 2025-01-14 NOTE — PROGRESS NOTES
Called [facility] again for update on path report. They stated they faxed it yesterday and again this morning. I have not received it. I confirmed fax number. I asked if they would email result since patient is being seen today. They will not. I am waiting on [facility] office fax number so I can get results faxed directly to the office fax.

## 2025-01-14 NOTE — PROGRESS NOTES
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  He received induction cisplatin based combination chemotherapy 4 cycles, followed by a postchemotherapy retroperitoneal lymph node dissection (RPLND) plus left partial nephrectomy. He is unaware of pathology from RPLND. He developed a postoperative left-sided hydrocele. On on 2014 and on 2014 contrast-enhanced CT scan of the chest respectively, showed stable left lower lobe 3 mm pulmonary nodule.  No new nodules were identified at that time.    Currently, Mr. Funez is undergoing surveillance. On 2024 scrotal and testicular ultrasound showed a left hydrocele.  There were no testicular masses. The patient's most recent surveillance cross-sectional imaging studies include a contrast-enhanced CT scan of the abdomen and pelvis on 2024 which did not show recurrent or metastatic disease.  On 2024, contrast-enhanced CT scan of the chest, abdomen and pelvis showed a 0.7 x 0.4 cm groundglass pulmonary nodule in the left upper lobe. The patient's most recent serum germ cell tumor biomarkers on January 10, 2025, including serum AFP and hCG were within normal limits.    Differential diagnosis for the patient's sub-centimeter left lung upper lobe nodule include an inflammatory lung infiltrate, scar tissue, or late recurrence of germ cell tumor, specifically a small left lung upper lobe teratoma.  At this point in time, I recommend to continue surveillance.  The patient will have gem cell tumor markers including serum AFP, hCG, and LDH as well as contrast-enhanced CT scan of  the chest every 3 months.  If the left lung upper lobe nodule grows to larger than 1 cm, the patient would require surgical resection of this lesion.  Conversely, if the lesion remains stable with a size less than 1 cm in diameter, he will continue surveillance.    Plan:  Continue surveillance, with periodic CT scan of the chest and germ cell tumor serum biomarkers  Contrast-enhanced CT scan of the chest and germ cell tumor biomarkers on March 18, 2025  Return to medical oncology clinic for history and physical exam and to review results of CT scan of the chest as well as gem cell tumor biomarkers on March 25, 2025      Orders:    CT chest w contrast; Future    LD,Blood; Future    AFP tumor marker; Future    hCG, quantitative; Future    Hydrocele in adult  The patient will have scrotal ultrasound on January 15, 2025.  I will review results of scrotal ultrasound as soon as available.       The patient understands and agrees with my management recommendations and plan of care. I answered questions to his satisfaction.      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  He received induction cisplatin based combination chemotherapy 4 cycles, followed by a postchemotherapy retroperitoneal lymph node dissection (RPLND) plus left partial nephrectomy. He is unaware of pathology from RPLND. He developed a postoperative left-sided hydrocele. On on July 23, 2014 and on November 14, 2014 contrast-enhanced CT scan of the chest respectively, showed stable left lower lobe 3 mm pulmonary nodule.  No new nodules were identified at that time.    Currently, the patient is undergoing surveillance. On January 14, 2024 scrotal and testicular ultrasound showed a left hydrocele.  There  were no testicular masses. The patient's most recent surveillance cross-sectional imaging studies include a contrast-enhanced CT scan of the abdomen and pelvis on November 11 2024 which did not show recurrent or metastatic disease.  On December 11, 2024, contrast-enhanced CT scan of the chest, abdomen and pelvis showed a 0.7 x 0.4 cm groundglass pulmonary nodule in the left upper lobe.  Radiographic differential diagnosis include postinflammatory changes or less likely a metastatic nodule, given this lesion has been present for more than 10 years. The patient's most recent serum germ cell tumor biomarkers on January 10, 2025, including AFP and hCG were within normal limits.    Today, Mr. Funez does not have new complaints.  He does not have pain.  He denied respiratory symptoms such as cough, sputum production, hemoptysis, dyspnea at rest or with exertion, or chest pain.  He denies new systemic, cardiac, gastrointestinal, genitourinary, musculoskeletal, dermatological, or neurologic symptoms.  He has excellent quality of life.      Pertinent Medical History   01/14/25:     Past Medical History:   Diagnosis Date    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HCC)     Renal disorder      Past Surgical History:   Procedure Laterality Date    KIDNEY SURGERY      SHOULDER SURGERY       Family History   Problem Relation Age of Onset    No Known Problems Father     No Known Problems Mother      Social History     Socioeconomic History    Marital status: /Civil Union     Spouse name: Not on file    Number of children: Not on file    Years of education: Not on file    Highest education level: Not on file   Occupational History    Not on file   Tobacco Use    Smoking status: Never    Smokeless tobacco: Current     Types: Chew   Vaping Use    Vaping status: Never Used   Substance and Sexual Activity    Alcohol use: Yes    Drug use: No    Sexual activity: Yes     Partners: Female   Other Topics Concern    Not on file   Social History Narrative     Not on file     Social Drivers of Health     Financial Resource Strain: Not on file   Food Insecurity: Not on file   Transportation Needs: Not on file   Physical Activity: Not on file   Stress: Not on file   Social Connections: Not on file   Intimate Partner Violence: Not on file   Housing Stability: Not on file       Review of Systems   Constitutional:  Negative for activity change, appetite change, chills, diaphoresis, fatigue, fever and unexpected weight change.   HENT:  Negative for congestion, dental problem, ear discharge, ear pain, facial swelling, hearing loss, mouth sores, nosebleeds, postnasal drip, rhinorrhea, sinus pain, sneezing, sore throat, tinnitus, trouble swallowing and voice change.    Eyes:  Negative for photophobia, pain, discharge, redness, itching and visual disturbance.   Respiratory:  Negative for apnea, cough, choking, chest tightness, shortness of breath, wheezing and stridor.    Cardiovascular:  Negative for chest pain, palpitations and leg swelling.   Gastrointestinal:  Negative for abdominal distention, abdominal pain, anal bleeding, blood in stool, constipation, diarrhea, nausea, rectal pain and vomiting.   Endocrine: Negative for cold intolerance and heat intolerance.   Genitourinary:  Negative for decreased urine volume, difficulty urinating, dysuria, enuresis, flank pain, frequency, hematuria and urgency.   Musculoskeletal:  Negative for arthralgias, back pain, gait problem, joint swelling, myalgias, neck pain and neck stiffness.   Skin:  Negative for color change, pallor, rash and wound.   Neurological:  Negative for dizziness, tremors, seizures, syncope, facial asymmetry, speech difficulty, weakness, light-headedness, numbness and headaches.   Hematological:  Negative for adenopathy. Does not bruise/bleed easily.   Psychiatric/Behavioral:  Negative for behavioral problems, confusion, dysphoric mood and sleep disturbance. The patient is not nervous/anxious.          Objective   BP  116/70 (BP Location: Left arm, Patient Position: Sitting, Cuff Size: Adult)   Pulse 90   Temp 97.8 °F (36.6 °C) (Temporal)   Resp 18   Ht 6' (1.829 m)   Wt 72.6 kg (160 lb)   SpO2 96%   BMI 21.70 kg/m²     Pain Screening:  Pain Score: 0-No pain  ECOG   0  Physical Exam  Constitutional:       Comments: Male patient well-developed, well-nourished without acute respiratory distress   HENT:      Head: Normocephalic and atraumatic.      Nose: Nose normal.      Mouth/Throat:      Mouth: Mucous membranes are moist.      Pharynx: Oropharynx is clear.   Eyes:      Extraocular Movements: Extraocular movements intact.      Conjunctiva/sclera: Conjunctivae normal.      Pupils: Pupils are equal, round, and reactive to light.      Comments: No scleral icterus   Neck:      Comments: No cervical, supraclavicular, axillary, epitrochlear, or inguinal lymphadenopathy.   Cardiovascular:      Rate and Rhythm: Normal rate and regular rhythm.      Pulses: Normal pulses.      Heart sounds: Normal heart sounds.   Pulmonary:      Effort: Pulmonary effort is normal.      Breath sounds: Normal breath sounds.   Abdominal:      General: Bowel sounds are normal.      Palpations: Abdomen is soft.      Comments: Abdomen soft, nontender, nonpainful.  No hepatomegaly.  No splenomegaly.  No rebound tenderness.  No lateral or shifting dullness.  Bowel sounds present, normal.    Musculoskeletal:         General: Normal range of motion.      Cervical back: Normal range of motion and neck supple.   Skin:     General: Skin is warm and dry.      Capillary Refill: Capillary refill takes less than 2 seconds.   Neurological:      General: No focal deficit present.      Mental Status: He is alert and oriented to person, place, and time. Mental status is at baseline.   Psychiatric:         Mood and Affect: Mood normal.         Behavior: Behavior normal.         Thought Content: Thought content normal.         Judgment: Judgment normal.     Labs: I have  reviewed the following labs:  Lab Results   Component Value Date/Time    WBC 8.87 11/04/2024 04:13 PM    RBC 5.14 11/04/2024 04:13 PM    Hemoglobin 15.7 11/04/2024 04:13 PM    Hematocrit 45.1 11/04/2024 04:13 PM    MCV 88 11/04/2024 04:13 PM    MCH 30.5 11/04/2024 04:13 PM    RDW 11.9 11/04/2024 04:13 PM    Platelets 277 11/04/2024 04:13 PM    Segmented % 65 11/04/2024 04:13 PM    Lymphocytes % 26 11/04/2024 04:13 PM    Monocytes % 8 11/04/2024 04:13 PM    Eosinophils Relative 1 11/04/2024 04:13 PM    Basophils Relative 0 11/04/2024 04:13 PM    Immature Grans % 0 11/04/2024 04:13 PM    Absolute Neutrophils 5.76 11/04/2024 04:13 PM     Lab Results   Component Value Date/Time    Potassium 4.0 01/10/2025 06:11 AM    Chloride 103 01/10/2025 06:11 AM    CO2 28 01/10/2025 06:11 AM    BUN 18 01/10/2025 06:11 AM    Creatinine 1.17 01/10/2025 06:11 AM    Glucose, Fasting 83 01/10/2025 06:11 AM    Calcium 9.4 01/10/2025 06:11 AM    AST 17 01/10/2025 06:11 AM    ALT 20 01/10/2025 06:11 AM    Alkaline Phosphatase 59 01/10/2025 06:11 AM    Total Protein 7.0 01/10/2025 06:11 AM    Albumin 4.5 01/10/2025 06:11 AM    Total Bilirubin 1.07 (H) 01/10/2025 06:11 AM    eGFR 82 01/10/2025 06:11 AM     Germ cell tumor serum biomarkers:    Component      Latest Ref Rng 12/2/2021 1/17/2024 1/10/2025   AFP-TUMOR MARKER      0.00 - 9.00 ng/mL 4.5  2.75  3.47      Component      Latest Ref Rng 12/2/2021 1/17/2024 1/10/2025   HCG TUMOR MARKER      <5 mlU/mL <2  <1  <0.6

## 2025-01-15 ENCOUNTER — HOSPITAL ENCOUNTER (OUTPATIENT)
Dept: ULTRASOUND IMAGING | Facility: HOSPITAL | Age: 35
Discharge: HOME | End: 2025-01-15
Attending: UROLOGY
Payer: COMMERCIAL

## 2025-01-15 DIAGNOSIS — N43.2 OTHER HYDROCELE: ICD-10-CM

## 2025-01-15 PROCEDURE — 76870 US EXAM SCROTUM: CPT

## 2025-02-14 ENCOUNTER — PATIENT OUTREACH (OUTPATIENT)
Dept: HEMATOLOGY ONCOLOGY | Facility: CLINIC | Age: 35
End: 2025-02-14

## 2025-02-18 ENCOUNTER — PATIENT OUTREACH (OUTPATIENT)
Dept: HEMATOLOGY ONCOLOGY | Facility: CLINIC | Age: 35
End: 2025-02-18

## 2025-02-18 ENCOUNTER — HOSPITAL ENCOUNTER (EMERGENCY)
Facility: HOSPITAL | Age: 35
Discharge: HOME | End: 2025-02-18
Attending: EMERGENCY MEDICINE
Payer: COMMERCIAL

## 2025-02-18 ENCOUNTER — APPOINTMENT (OUTPATIENT)
Dept: RADIOLOGY | Facility: HOSPITAL | Age: 35
End: 2025-02-18
Payer: COMMERCIAL

## 2025-02-18 ENCOUNTER — NURSE TRIAGE (OUTPATIENT)
Age: 35
End: 2025-02-18

## 2025-02-18 VITALS
RESPIRATION RATE: 18 BRPM | OXYGEN SATURATION: 100 % | DIASTOLIC BLOOD PRESSURE: 90 MMHG | HEART RATE: 78 BPM | SYSTOLIC BLOOD PRESSURE: 150 MMHG | TEMPERATURE: 98.4 F

## 2025-02-18 DIAGNOSIS — R07.89 ATYPICAL CHEST PAIN: Primary | ICD-10-CM

## 2025-02-18 DIAGNOSIS — I45.6 WPW (WOLFF-PARKINSON-WHITE SYNDROME): ICD-10-CM

## 2025-02-18 LAB
ALBUMIN SERPL BCG-MCNC: 4.6 G/DL (ref 3.5–5)
ALP SERPL-CCNC: 62 U/L (ref 34–104)
ALT SERPL W P-5'-P-CCNC: 19 U/L (ref 7–52)
ANION GAP SERPL CALCULATED.3IONS-SCNC: 10 MMOL/L (ref 4–13)
AST SERPL W P-5'-P-CCNC: 18 U/L (ref 13–39)
BASOPHILS # BLD AUTO: 0.02 THOUSANDS/ΜL (ref 0–0.1)
BASOPHILS NFR BLD AUTO: 0 % (ref 0–1)
BILIRUB SERPL-MCNC: 1.05 MG/DL (ref 0.2–1)
BUN SERPL-MCNC: 15 MG/DL (ref 5–25)
CALCIUM SERPL-MCNC: 9.3 MG/DL (ref 8.4–10.2)
CARDIAC TROPONIN I PNL SERPL HS: <2 NG/L (ref ?–50)
CARDIAC TROPONIN I PNL SERPL HS: <2 NG/L (ref ?–50)
CHLORIDE SERPL-SCNC: 105 MMOL/L (ref 96–108)
CO2 SERPL-SCNC: 24 MMOL/L (ref 21–32)
CREAT SERPL-MCNC: 1.21 MG/DL (ref 0.6–1.3)
EOSINOPHIL # BLD AUTO: 0.14 THOUSAND/ΜL (ref 0–0.61)
EOSINOPHIL NFR BLD AUTO: 2 % (ref 0–6)
ERYTHROCYTE [DISTWIDTH] IN BLOOD BY AUTOMATED COUNT: 12 % (ref 11.6–15.1)
GFR SERPL CREATININE-BSD FRML MDRD: 78 ML/MIN/1.73SQ M
GLUCOSE SERPL-MCNC: 96 MG/DL (ref 65–140)
HCT VFR BLD AUTO: 46.3 % (ref 36.5–49.3)
HGB BLD-MCNC: 15.7 G/DL (ref 12–17)
IMM GRANULOCYTES # BLD AUTO: 0.01 THOUSAND/UL (ref 0–0.2)
IMM GRANULOCYTES NFR BLD AUTO: 0 % (ref 0–2)
LYMPHOCYTES # BLD AUTO: 2.46 THOUSANDS/ΜL (ref 0.6–4.47)
LYMPHOCYTES NFR BLD AUTO: 32 % (ref 14–44)
MCH RBC QN AUTO: 30.3 PG (ref 26.8–34.3)
MCHC RBC AUTO-ENTMCNC: 33.9 G/DL (ref 31.4–37.4)
MCV RBC AUTO: 89 FL (ref 82–98)
MONOCYTES # BLD AUTO: 0.85 THOUSAND/ΜL (ref 0.17–1.22)
MONOCYTES NFR BLD AUTO: 11 % (ref 4–12)
NEUTROPHILS # BLD AUTO: 4.29 THOUSANDS/ΜL (ref 1.85–7.62)
NEUTS SEG NFR BLD AUTO: 55 % (ref 43–75)
NRBC BLD AUTO-RTO: 0 /100 WBCS
PLATELET # BLD AUTO: 267 THOUSANDS/UL (ref 149–390)
PMV BLD AUTO: 9.5 FL (ref 8.9–12.7)
POTASSIUM SERPL-SCNC: 3.7 MMOL/L (ref 3.5–5.3)
PROT SERPL-MCNC: 7.2 G/DL (ref 6.4–8.4)
RBC # BLD AUTO: 5.19 MILLION/UL (ref 3.88–5.62)
SODIUM SERPL-SCNC: 139 MMOL/L (ref 135–147)
WBC # BLD AUTO: 7.77 THOUSAND/UL (ref 4.31–10.16)

## 2025-02-18 PROCEDURE — 71046 X-RAY EXAM CHEST 2 VIEWS: CPT

## 2025-02-18 PROCEDURE — 84484 ASSAY OF TROPONIN QUANT: CPT

## 2025-02-18 PROCEDURE — 93005 ELECTROCARDIOGRAM TRACING: CPT

## 2025-02-18 PROCEDURE — 36415 COLL VENOUS BLD VENIPUNCTURE: CPT

## 2025-02-18 PROCEDURE — 99285 EMERGENCY DEPT VISIT HI MDM: CPT | Performed by: PHYSICIAN ASSISTANT

## 2025-02-18 PROCEDURE — 80053 COMPREHEN METABOLIC PANEL: CPT

## 2025-02-18 PROCEDURE — 85025 COMPLETE CBC W/AUTO DIFF WBC: CPT

## 2025-02-18 PROCEDURE — 99285 EMERGENCY DEPT VISIT HI MDM: CPT

## 2025-02-18 NOTE — TELEPHONE ENCOUNTER
"Patient began 30 min ago with low pulse identified by smart watch (mid 30s), chest pain, and dizziness.    Chest pain has been constant since then. Started out 7/10 and is now 4/10. Patient has history of high cholesterol.     He is agreeable to ED for evaluation and will call 911 for transport.    ---------------------------------------------      Reason for Disposition   Chest pain lasting longer than 5 minutes, over 30 years old, and at least one cardiac risk factor (e.g., diabetes mellitus, high blood pressure, high cholesterol, obesity with BMI 30 or higher, smoker, or strong family history of heart disease)    Answer Assessment - Initial Assessment Questions  1. LOCATION: \"Where does it hurt?\"        Middle of chest    2. RADIATION: \"Does the pain go anywhere else?\" (e.g., into neck, jaw, arms, back)      Dnies    3. ONSET: \"When did the chest pain begin?\" (Minutes, hours or days)       30 min ago    4. PATTERN: \"Does the pain come and go, or has it been constant since it started?\"  \"Does it get worse with exertion?\"       Constant    5. DURATION: \"How long does it last\" (e.g., seconds, minutes, hours)      Constant    6. SEVERITY: \"How bad is the pain?\"  (e.g., Scale 1-10; mild, moderate, or severe)      4/10    7. CARDIAC RISK FACTORS: \"Do you have any history of heart problems or risk factors for heart disease?\" (e.g., angina, prior heart attack; diabetes, high blood pressure, high cholesterol, smoker, or strong family history of heart disease)      High cholesterol.    8. PULMONARY RISK FACTORS: \"Do you have any history of lung disease?\"  (e.g., blood clots in lung, asthma, emphysema, birth control pills)      Denies    9. CAUSE: \"What do you think is causing the chest pain?\"      Unknown    10. OTHER SYMPTOMS: \"Do you have any other symptoms?\" (e.g., dizziness, nausea, vomiting, sweating, fever, difficulty breathing, cough)        Chest pain, Dizziness    Protocols used: Chest Pain-Adult-[place]    "

## 2025-02-18 NOTE — PROGRESS NOTES
I reached out and spoke with Igor, now that consults have been completed with the oncology teams. I introduced myself and explained my role as their Patient Navigator. I reviewed for any barriers to care and offered supportive services as needed. I reviewed and updated the members assigned to the care team in [facility]. He knows the members of the care team as well as how and when to contact them with any needs.     Distress Thermometer completed at this time. Patient scored 3/10. Based on responses to DT, no indication for referral to SW needed at this time. .     He is currently able to drive and denies any transportation needs.      He denies any uncontrolled symptoms. Discussed role of Palliative Care in symptom and side effect management. Declined referral at this time.    He states that he is eating and drinking as per usual with no unintentional weight loss.       Patient does not smoke. Uses chewing tobacco, thinking about quitting.    He states he is well supported by family and friends.  Community support groups discussed including the Cancer Support Community of the [facility]. Patient declined information at this time.     He feels he has adequate insurance coverage and denies any financial concerns at this time.     He verbalizes managing the schedules well.   Future Appointments   Date Time Provider Department Center   3/11/2025  4:00 PM MO CT 1 MO CT [facility]   3/25/2025  4:00 PM Medhat Yoon MD HEM ONC [facility]     He is on surveillance at this point, therefore I will not follow patient. I have provided my direct contact information via [email or web address] and welcome them to contact me if needs as discussed above change. He was appreciative for the call.

## 2025-02-19 LAB
ATRIAL RATE: 79 BPM
P AXIS: 69 DEGREES
PR INTERVAL: 116 MS
QRS AXIS: 99 DEGREES
QRSD INTERVAL: 156 MS
QT INTERVAL: 396 MS
QTC INTERVAL: 454 MS
T WAVE AXIS: 68 DEGREES
VENTRICULAR RATE: 79 BPM

## 2025-02-19 PROCEDURE — 93010 ELECTROCARDIOGRAM REPORT: CPT | Performed by: INTERNAL MEDICINE

## 2025-02-19 NOTE — ED PROVIDER NOTES
Time reflects when diagnosis was documented in both MDM as applicable and the Disposition within this note       Time User Action Codes Description Comment    2/18/2025  7:12 PM Marjorie Fu Add [R07.89] Atypical chest pain     2/18/2025  7:13 PM Marjorie Fu Add [I45.6] WPW (Daija-Parkinson-White syndrome)           ED Disposition       ED Disposition   Discharge    Condition   Stable    Date/Time   Tue Feb 18, 2025  7:12 PM    Comment   Igor Funez discharge to home/self care.                   Assessment & Plan       Medical Decision Making  Differential diagnosis includes but not limited to: Dysrhythmia, heart block, GERD, near syncopal episode.  ACS less likely    Problems Addressed:  Atypical chest pain: acute illness or injury  WPW (Daija-Parkinson-White syndrome): acute illness or injury     Details: Discussed previous EKG results from May 8, 2022 with patient.  States that he was seen in the emergency department at that time prior to going to rehab for alcohol abuse.  Does not recall the exact events of the ER visit.  Does not follow with cardiology at this time.    Amount and/or Complexity of Data Reviewed  Labs: ordered. Decision-making details documented in ED Course.  Radiology: ordered. Decision-making details documented in ED Course.  Discussion of management or test interpretation with external provider(s): Discussed management with cardiolgist on-call.  Will arrange for Holter monitor as well as follow-up with electrophysiology.        ED Course as of 02/18/25 2054 Tue Feb 18, 2025   1859 Message sent to cardiology regarding case.   1909 Discussed case with cardiology.  Recommendations as followed: Patient can go home.  Cardiology will order a heart monitor for him to wear.  It will be mailed to his house.  It is a patch monitor: It is called Zio.  It will have prelabeled return instructions and packaging.  The monitor is to be worn for 2 weeks.  You will be able to shower and exercise  with the monitor on but not submerged in water.  Cardiology will also place a referral for EP so that he can be seen in the office after the monitor has resulted.  The appointment will be for Pura Barros.       Medications - No data to display    ED Risk Strat Scores   HEART Risk Score      Flowsheet Row Most Recent Value   Heart Score Risk Calculator    History 1 Filed at: 02/18/2025 1912   ECG 1 Filed at: 02/18/2025 1912   Age 0 Filed at: 02/18/2025 1912   Risk Factors 1 Filed at: 02/18/2025 1912   Troponin 0 Filed at: 02/18/2025 1912   HEART Score 3 Filed at: 02/18/2025 1912          HEART Risk Score      Flowsheet Row Most Recent Value   Heart Score Risk Calculator    History 1 Filed at: 02/18/2025 1912   ECG 1 Filed at: 02/18/2025 1912   Age 0 Filed at: 02/18/2025 1912   Risk Factors 1 Filed at: 02/18/2025 1912   Troponin 0 Filed at: 02/18/2025 1912   HEART Score 3 Filed at: 02/18/2025 1912                                                  History of Present Illness       Chief Complaint   Patient presents with    Chest Pain     Pt reports CP all day today. States he thought it was heart burn but reports his apple watch said his HR was reading in the 30s so he came for eval       Past Medical History:   Diagnosis Date    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HCC)     Renal disorder       Past Surgical History:   Procedure Laterality Date    KIDNEY SURGERY      SHOULDER SURGERY        Family History   Problem Relation Age of Onset    No Known Problems Father     No Known Problems Mother       Social History     Tobacco Use    Smoking status: Never    Smokeless tobacco: Current     Types: Chew   Vaping Use    Vaping status: Never Used   Substance Use Topics    Alcohol use: Yes    Drug use: No      E-Cigarette/Vaping    E-Cigarette Use Never User       E-Cigarette/Vaping Substances    Nicotine No     THC No     CBD No     Flavoring No     Other No     Unknown No       I have reviewed and agree with the history as documented.      34-year-old male presents the emergency department with complaints of chest discomfort.  States that earlier today he was at work and started to feel what he thought was heartburn.  Notes that shortly afterward he began to get slightly lightheaded and felt as if he might pass out.  Patient reports that he had looked at his watch to check the time and noticed that it was assuring a low heart rate in the 50s.  States that at its lowest the heart rate read 36 bpm.  Patient states that it was persistently low for approximately 20 to 30 minutes prior to resolution.  Called his family doctor and was sent to the emergency department for further evaluation.  States that he was told once previously that he had an abnormal health rhythm but does not recall what that rhythm was.  Notes that he wore Holter monitor once previously when he had palpitations while going through chemotherapy several years ago for testicular cancer.  Does not follow with a cardiologist presently.      History provided by:  Patient   used: No    Chest Pain  Pain location:  Substernal area  Pain quality: aching    Pain radiates to:  Does not radiate  Pain radiates to the back: no    Pain severity:  Mild  Onset quality:  Unable to specify  Duration:  2 hours  Timing:  Unable to specify  Progression:  Unable to specify  Chronicity:  New  Relieved by:  Nothing  Ineffective treatments:  None tried  Associated symptoms: dizziness    Associated symptoms: no abdominal pain, no back pain, no cough, no dysphagia, no fever, no headache, no heartburn, no lower extremity edema, no nausea, no numbness, no palpitations, no shortness of breath, no syncope, not vomiting and no weakness        Review of Systems   Constitutional:  Negative for activity change, appetite change, chills and fever.   HENT:  Negative for congestion, dental problem, drooling, ear discharge, ear pain, mouth sores, nosebleeds, rhinorrhea, sore throat and trouble  swallowing.    Eyes:  Negative for pain, discharge and itching.   Respiratory:  Negative for cough, chest tightness, shortness of breath and wheezing.    Cardiovascular:  Positive for chest pain. Negative for palpitations and syncope.   Gastrointestinal:  Negative for abdominal pain, blood in stool, constipation, diarrhea, heartburn, nausea and vomiting.   Endocrine: Negative for cold intolerance and heat intolerance.   Genitourinary:  Negative for difficulty urinating, dysuria, flank pain, frequency and urgency.   Musculoskeletal:  Negative for back pain.   Skin:  Negative for rash and wound.   Allergic/Immunologic: Negative for food allergies and immunocompromised state.   Neurological:  Positive for dizziness. Negative for seizures, syncope, weakness, numbness and headaches.   Psychiatric/Behavioral:  Negative for agitation, behavioral problems and confusion.            Objective       ED Triage Vitals   Temperature Pulse Blood Pressure Respirations SpO2 Patient Position - Orthostatic VS   02/18/25 1622 02/18/25 1620 02/18/25 1620 02/18/25 1620 02/18/25 1620 02/18/25 1620   98.4 °F (36.9 °C) 86 150/90 18 99 % Sitting      Temp Source Heart Rate Source BP Location FiO2 (%) Pain Score    02/18/25 1622 02/18/25 1620 02/18/25 1620 -- 02/18/25 1620    Oral Monitor Right arm  5      Vitals      Date and Time Temp Pulse SpO2 Resp BP Pain Score FACES Pain Rating User   02/18/25 1622 98.4 °F (36.9 °C) 78 100 % 18 150/90 -- -- AK   02/18/25 1620 -- 86 99 % 18 150/90 5 -- TB            Physical Exam  Vitals and nursing note reviewed.   Constitutional:       General: He is not in acute distress.     Appearance: He is not diaphoretic.   HENT:      Head: Normocephalic and atraumatic.      Right Ear: External ear normal.      Left Ear: External ear normal.      Mouth/Throat:      Pharynx: No oropharyngeal exudate.   Eyes:      Conjunctiva/sclera: Conjunctivae normal.   Neck:      Vascular: No JVD.      Trachea: No tracheal  deviation.   Cardiovascular:      Rate and Rhythm: Normal rate and regular rhythm.      Heart sounds: Normal heart sounds. No murmur heard.     No friction rub. No gallop.   Pulmonary:      Effort: Pulmonary effort is normal. No respiratory distress.      Breath sounds: No wheezing or rales.   Chest:      Chest wall: No tenderness.   Musculoskeletal:         General: No tenderness or deformity. Normal range of motion.   Lymphadenopathy:      Cervical: No cervical adenopathy.   Skin:     General: Skin is warm and dry.      Findings: No erythema or rash.   Neurological:      General: No focal deficit present.      Mental Status: He is alert and oriented to person, place, and time.   Psychiatric:         Mood and Affect: Mood normal.         Behavior: Behavior normal.         Results Reviewed       Procedure Component Value Units Date/Time    HS Troponin I 2hr [[id number]] Collected: 02/18/25 1802    Lab Status: Final result Specimen: Blood from Arm, Left Updated: 02/18/25 1835     hs TnI 2hr <2 ng/L      Delta 2hr hsTnI --    HS Troponin I 4hr [[id number]]     Lab Status: No result Specimen: Blood     HS Troponin 0hr (reflex protocol) [[id number]]  (Normal) Collected: 02/18/25 1624    Lab Status: Final result Specimen: Blood from Arm, Left Updated: 02/18/25 1717     hs TnI 0hr <2 ng/L     Comprehensive metabolic panel [[id number]]  (Abnormal) Collected: 02/18/25 1624    Lab Status: Final result Specimen: Blood from Arm, Left Updated: 02/18/25 1711     Sodium 139 mmol/L      Potassium 3.7 mmol/L      Chloride 105 mmol/L      CO2 24 mmol/L      ANION GAP 10 mmol/L      BUN 15 mg/dL      Creatinine 1.21 mg/dL      Glucose 96 mg/dL      Calcium 9.3 mg/dL      AST 18 U/L      ALT 19 U/L      Alkaline Phosphatase 62 U/L      Total Protein 7.2 g/dL      Albumin 4.6 g/dL      Total Bilirubin 1.05 mg/dL      eGFR 78 ml/min/1.73sq m     Narrative:      National Kid[employer] Disease Foundation guidelines for Chronic Kidney Disease (CKD):      Stage 1 with normal or high GFR (GFR > 90 mL/min/1.73 square meters)    Stage 2 Mild CKD (GFR = 60-89 mL/min/1.73 square meters)    Stage 3A Moderate CKD (GFR = 45-59 mL/min/1.73 square meters)    Stage 3B Moderate CKD (GFR = 30-44 mL/min/1.73 square meters)    Stage 4 Severe CKD (GFR = 15-29 mL/min/1.73 square meters)    Stage 5 End Stage CKD (GFR <15 mL/min/1.73 square meters)  Note: GFR calculation is accurate only with a steady state creatinine    CBC and differential [[id number]] Collected: 02/18/25 1624    Lab Status: Final result Specimen: Blood from Arm, Left Updated: 02/18/25 1640     WBC 7.77 Thousand/uL      RBC 5.19 Million/uL      Hemoglobin 15.7 g/dL      Hematocrit 46.3 %      MCV 89 fL      MCH 30.3 pg      MCHC 33.9 g/dL      RDW 12.0 %      MPV 9.5 fL      Platelets 267 Thousands/uL      nRBC 0 /100 WBCs      Segmented % 55 %      Immature Grans % 0 %      Lymphocytes % 32 %      Monocytes % 11 %      Eosinophils Relative 2 %      Basophils Relative 0 %      Absolute Neutrophils 4.29 Thousands/µL      Absolute Immature Grans 0.01 Thousand/uL      Absolute Lymphocytes 2.46 Thousands/µL      Absolute Monocytes 0.85 Thousand/µL      Eosinophils Absolute 0.14 Thousand/µL      Basophils Absolute 0.02 Thousands/µL             XR chest 2 views   Final Interpretation by Mehul Brand MD (02/18 1477)      No acute cardiopulmonary disease.            Workstation performed: [id number]             ECG 12 Lead Documentation Only    Date/Time: 2/18/2025 8:48 PM    Performed by: Marjorie Fu PA-C  Authorized by: Marjorie Fu PA-C    Indications / Diagnosis:  CP  ECG reviewed by me, the ED Provider: yes    Patient location:  ED  Previous ECG:     Previous ECG:  Compared to current    Comparison ECG info:  5/8/22    Similarity:  No change  Interpretation:     Interpretation: abnormal    Rate:     ECG rate:  80    ECG rate assessment: normal    Rhythm:     Rhythm: sinus rhythm    Ectopy:      Ectopy: none    Conduction:     Conduction: abnormal      Abnormal conduction comment:  WPW  ST segments:     ST segments:  Normal  T waves:     T waves: normal        ED Medication and Procedure Management   None     There are no discharge medications for this patient.    Outpatient Discharge Orders   Ambulatory Referral to Cardiac Electrophysiology   Standing Status: Future Standing Exp. Date: 02/18/26      Ambulatory referral to Cardiac Electrophysiology   Standing Status: Future Standing Exp. Date: 02/18/26      Zio Monitor   Standing Status: Future Standing Exp. Date: 02/18/29     ED SEPSIS DOCUMENTATION   Time reflects when diagnosis was documented in both MDM as applicable and the Disposition within this note       Time User Action Codes Description Comment    2/18/2025  7:12 PM Marjorie Fu Add [R07.89] Atypical chest pain     2/18/2025  7:13 PM Marjorie Fu [I45.6] WPW (Daija-Parkinson-White syndrome)                  Marjorie Fu PA-C  02/18/25 2055

## 2025-02-19 NOTE — DISCHARGE INSTRUCTIONS
I discussed your symptoms and EKG with cardiology.  You are able to go home and resume your normal activities.  Cardiology will be ordering a heart monitor for you to wear.  It will be mailed to your house.  Will have relabeled return instructions and packaging.  You will need to wear the patch for 2 weeks.  He will be able to shower and exercise with a monitor but are unable to submerge it in water.  Our cardiology team will also place a referral for their electrophysiology department to see you in follow-up after the Holter monitor has been completed.  The office is located on [address] [facility].    Return to the emergency department if you develop any new or worsening symptoms.

## 2025-03-11 ENCOUNTER — HOSPITAL ENCOUNTER (OUTPATIENT)
Dept: CT IMAGING | Facility: HOSPITAL | Age: 35
Discharge: HOME | End: 2025-03-11
Attending: INTERNAL MEDICINE
Payer: COMMERCIAL

## 2025-03-11 DIAGNOSIS — C48.0 GERM CELL TUMOR OF RETROPERITONEUM (HCC): ICD-10-CM

## 2025-03-11 PROCEDURE — 71260 CT THORAX DX C+: CPT

## 2025-03-11 RX ADMIN — IOHEXOL 85 ML: 350 INJECTION, SOLUTION INTRAVENOUS at 16:02

## 2025-03-18 ENCOUNTER — APPOINTMENT (OUTPATIENT)
Dept: LAB | Facility: MEDICAL CENTER | Age: 35
End: 2025-03-18
Payer: COMMERCIAL

## 2025-03-18 DIAGNOSIS — C48.0 GERM CELL TUMOR OF RETROPERITONEUM (HCC): ICD-10-CM

## 2025-03-18 LAB
AFP-TM SERPL-MCNC: 2.91 NG/ML (ref 0–9)
B-HCG SERPL-ACNC: <0.6 MIU/ML (ref 0–0.6)
LDH SERPL-CCNC: 138 U/L (ref 140–271)

## 2025-03-18 PROCEDURE — 36415 COLL VENOUS BLD VENIPUNCTURE: CPT

## 2025-03-18 PROCEDURE — 83615 LACTATE (LD) (LDH) ENZYME: CPT

## 2025-03-18 PROCEDURE — 84702 CHORIONIC GONADOTROPIN TEST: CPT

## 2025-03-18 PROCEDURE — 82105 ALPHA-FETOPROTEIN SERUM: CPT

## 2025-03-25 ENCOUNTER — OFFICE VISIT (OUTPATIENT)
Dept: HEMATOLOGY ONCOLOGY | Facility: CLINIC | Age: 35
End: 2025-03-25
Payer: COMMERCIAL

## 2025-03-25 VITALS
SYSTOLIC BLOOD PRESSURE: 118 MMHG | DIASTOLIC BLOOD PRESSURE: 80 MMHG | WEIGHT: 155 LBS | HEART RATE: 95 BPM | BODY MASS INDEX: 20.99 KG/M2 | RESPIRATION RATE: 18 BRPM | HEIGHT: 72 IN | TEMPERATURE: 98.1 F | OXYGEN SATURATION: 97 %

## 2025-03-25 DIAGNOSIS — C48.0 GERM CELL TUMOR OF RETROPERITONEUM (HCC): Primary | ICD-10-CM

## 2025-03-25 PROCEDURE — 99213 OFFICE O/P EST LOW 20 MIN: CPT | Performed by: INTERNAL MEDICINE

## 2025-03-25 NOTE — ASSESSMENT & PLAN NOTE
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  He received induction cisplatin based combination chemotherapy 4 cycles, followed by a postchemotherapy retroperitoneal lymph node dissection (RPLND) plus left partial nephrectomy. He is unaware of pathology from RPLND. He developed a postoperative left-sided hydrocele. On on July 23, 2014 and on November 14, 2014 contrast-enhanced CT scan of the chest respectively, showed stable left lower lobe 3 mm pulmonary nodule.  No new nodules were identified at that time.     Currently, Mr. Funez is undergoing surveillance. On January 14, 2024 scrotal and testicular ultrasound showed a left hydrocele.  There were no testicular masses. The patient's most recent surveillance cross-sectional imaging studies include a contrast-enhanced CT scan of the abdomen and pelvis on November 11 2024 which did not show recurrent or metastatic disease.  On December 11, 2024, contrast-enhanced CT scan of the chest, abdomen and pelvis showed a 0.7 x 0.4 cm groundglass pulmonary nodule in the left upper lobe. The patient's most recent serum germ cell tumor biomarkers on January 10, 2025, including serum AFP and hCG were within normal limits.Differential diagnosis for the patient's sub-centimeter left lung upper lobe nodule include an inflammatory lung infiltrate, scar tissue, or late recurrence of germ cell tumor, specifically a small left lung upper lobe teratoma.       Interim Assessment: On March 11, 2025,  CT scan of the chest without contrast showed minimal increase of the left upper lobe groundglass nodule from the prior study in December 2024. This could represent an adenocarcinoma spectrum lesion.  In addition on March 18, 2025, germ cell tumor markers including serum AFP, hCG and LDH were within normal limits.  Mr. Funez remains asymptomatic.    Today, I discussed results of CT scan of the chest  from March 11, 2025 with the patient and his wife.  The main diagnostic possibilities are a growing left lung upper lobe teratoma versus a new primary non-small cell lung cancer.  We made a decision to proceed with a FDG PET CT scan.  We would expect no left lung nodule FDG radiotracer uptake in the setting of a growing teratoma.  Conversely, in the setting of stage I non-small cell lung cancer FDG PET/CT scan would most likely show increased FDG radiotracer uptake in the left lung nodule.     Plan:  FDG PET CT scan on April 10, 2025   I will make further management recommendations such as resection of the left lung upper lobe nodule based on results of FDG PET CT scan  Return to medical oncology clinic for history and physical exam and to review results of FDG PET CT scan on April 11, 2025     Mr. Funez understands and agrees with my management recommendations and plan of care. I answered questions to his satisfaction.

## 2025-03-25 NOTE — PROGRESS NOTES
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  He received induction cisplatin based combination chemotherapy 4 cycles, followed by a postchemotherapy retroperitoneal lymph node dissection (RPLND) plus left partial nephrectomy. He is unaware of pathology from RPLND. He developed a postoperative left-sided hydrocele. On on 2014 and on 2014 contrast-enhanced CT scan of the chest respectively, showed stable left lower lobe 3 mm pulmonary nodule.  No new nodules were identified at that time.     Currently, Mr. Funez is undergoing surveillance. On 2024 scrotal and testicular ultrasound showed a left hydrocele.  There were no testicular masses. The patient's most recent surveillance cross-sectional imaging studies include a contrast-enhanced CT scan of the abdomen and pelvis on 2024 which did not show recurrent or metastatic disease.  On 2024, contrast-enhanced CT scan of the chest, abdomen and pelvis showed a 0.7 x 0.4 cm groundglass pulmonary nodule in the left upper lobe. The patient's most recent serum germ cell tumor biomarkers on January 10, 2025, including serum AFP and hCG were within normal limits.Differential diagnosis for the patient's sub-centimeter left lung upper lobe nodule include an inflammatory lung infiltrate, scar tissue, or late recurrence of germ cell tumor, specifically a small left lung upper lobe teratoma.       Interim Assessment: On 2025,  CT scan of the chest without contrast showed minimal increase of the left upper lobe groundglass nodule from the prior study in December  2024. This could represent an adenocarcinoma spectrum lesion.  In addition on March 18, 2025, germ cell tumor markers including serum AFP, hCG and LDH were within normal limits.  Mr. Funez remains asymptomatic.    Today, I discussed results of CT scan of the chest from March 11, 2025 with the patient and his wife.  The main diagnostic possibilities are a growing left lung upper lobe teratoma versus a new primary non-small cell lung cancer.  We made a decision to proceed with a FDG PET CT scan.  We would expect no left lung nodule FDG radiotracer uptake in the setting of a growing teratoma.  Conversely, in the setting of stage I non-small cell lung cancer FDG PET/CT scan would most likely show increased FDG radiotracer uptake in the left lung nodule.     Plan:  FDG PET CT scan on April 10, 2025   I will make further management recommendations such as resection of the left lung upper lobe nodule based on results of FDG PET CT scan  Return to medical oncology clinic for history and physical exam and to review results of FDG PET CT scan on April 11, 2025     Mr. Funez understands and agrees with my management recommendations and plan of care. I answered questions to his satisfaction.      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  He received induction cisplatin based combination chemotherapy 4 cycles, followed by a postchemotherapy retroperitoneal lymph node dissection (RPLND) plus left partial nephrectomy. He is unaware of pathology from RPLND. He developed a postoperative left-sided hydrocele. On on July 23, 2014 and on November 14, 2014 contrast-enhanced CT scan of the chest respectively, showed stable left lower lobe 3 mm pulmonary nodule.  No new nodules were identified at that time.     Currently, Mr. Funez is undergoing surveillance.  On January 14, 2024 scrotal and testicular ultrasound showed a left hydrocele.  There were no testicular masses. The patient's most recent surveillance cross-sectional imaging studies include a contrast-enhanced CT scan of the abdomen and pelvis on November 11 2024 which did not show recurrent or metastatic disease.  On December 11, 2024, contrast-enhanced CT scan of the chest, abdomen and pelvis showed a 0.7 x 0.4 cm groundglass pulmonary nodule in the left upper lobe. The patient's most recent serum germ cell tumor biomarkers on January 10, 2025, including serum AFP and hCG were within normal limits.Differential diagnosis for the patient's sub-centimeter left lung upper lobe nodule include an inflammatory lung infiltrate, scar tissue, or late recurrence of germ cell tumor, specifically a small left lung upper lobe teratoma.       Interim History: On March 11, 2025,  CT scan of the chest without contrast showed minimal increase of the left upper lobe groundglass nodule from the prior study in December 2024. This could represent an adenocarcinoma spectrum lesion.  In addition on March 18, 2025, germ cell tumor markers including serum AFP, hCG and LDH were within normal limits.  Today, Mr. Funez remains asymptomatic.  He denies new systemic, cardiorespiratory, genitourinary, gastrointestinal, muscle skeletal, dermatological, or neurologic symptoms.  He does not have pain.    Pertinent Medical History   Past Medical History:   Diagnosis Date    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HCC)     Renal disorder         Review of Systems   Constitutional:  Negative for activity change, appetite change, chills, diaphoresis, fatigue, fever and unexpected weight change.   HENT:  Negative for congestion, dental problem, ear discharge, ear pain, facial swelling, hearing loss, mouth sores, nosebleeds, postnasal drip, rhinorrhea, sinus pain, sneezing, sore throat, tinnitus, trouble swallowing and voice change.    Eyes:  Negative for photophobia, pain,  discharge, redness, itching and visual disturbance.   Respiratory:  Negative for apnea, cough, choking, chest tightness, shortness of breath, wheezing and stridor.    Cardiovascular:  Negative for chest pain, palpitations and leg swelling.   Gastrointestinal:  Negative for abdominal distention, abdominal pain, anal bleeding, blood in stool, constipation, diarrhea, nausea, rectal pain and vomiting.   Endocrine: Negative for cold intolerance and heat intolerance.   Genitourinary:  Negative for decreased urine volume, difficulty urinating, dysuria, enuresis, flank pain, frequency, hematuria and urgency.   Musculoskeletal:  Negative for arthralgias, back pain, gait problem, joint swelling, myalgias, neck pain and neck stiffness.   Skin:  Negative for color change, pallor, rash and wound.   Neurological:  Negative for dizziness, tremors, seizures, syncope, facial asymmetry, speech difficulty, weakness, light-headedness, numbness and headaches.   Hematological:  Negative for adenopathy. Does not bruise/bleed easily.   Psychiatric/Behavioral:  Negative for behavioral problems, confusion, dysphoric mood and sleep disturbance. The patient is not nervous/anxious.       Objective   /80 (BP Location: Left arm, Patient Position: Sitting, Cuff Size: Adult)   Pulse 95   Temp 98.1 °F (36.7 °C) (Temporal)   Resp 18   Ht 6' (1.829 m)   Wt 70.3 kg (155 lb)   SpO2 97%   BMI 21.02 kg/m²     Pain Screening:  Pain Score: 0-No pain  ECOG   0  Physical Exam  Constitutional:       Comments: Male patient well-developed, well-nourished, without acute respiratory distress   HENT:      Head: Normocephalic and atraumatic.      Nose: Nose normal.      Mouth/Throat:      Mouth: Mucous membranes are moist.      Pharynx: Oropharynx is clear.   Eyes:      Extraocular Movements: Extraocular movements intact.      Conjunctiva/sclera: Conjunctivae normal.      Pupils: Pupils are equal, round, and reactive to light.      Comments: No scleral  icterus   Neck:      Comments: No cervical, supraclavicular, axillary, epitrochlear, or inguinal lymphadenopathy.   Cardiovascular:      Rate and Rhythm: Normal rate and regular rhythm.      Pulses: Normal pulses.      Heart sounds: Normal heart sounds.   Pulmonary:      Effort: Pulmonary effort is normal.      Breath sounds: Normal breath sounds.   Abdominal:      General: Bowel sounds are normal.      Palpations: Abdomen is soft.      Comments: Abdomen soft, nontender, nonpainful.  No hepatomegaly.  No splenomegaly.  No rebound tenderness.  No lateral or shifting dullness.  Bowel sounds present, normal.    Musculoskeletal:         General: Normal range of motion.      Cervical back: Normal range of motion and neck supple.   Skin:     General: Skin is warm and dry.      Capillary Refill: Capillary refill takes less than 2 seconds.   Neurological:      General: No focal deficit present.      Mental Status: He is alert and oriented to person, place, and time. Mental status is at baseline.   Psychiatric:         Mood and Affect: Mood normal.         Behavior: Behavior normal.         Thought Content: Thought content normal.         Judgment: Judgment normal.         Labs: I have reviewed the following labs:  Lab Results   Component Value Date/Time    WBC 7.77 02/18/2025 04:24 PM    RBC 5.19 02/18/2025 04:24 PM    Hemoglobin 15.7 02/18/2025 04:24 PM    Hematocrit 46.3 02/18/2025 04:24 PM    MCV 89 02/18/2025 04:24 PM    MCH 30.3 02/18/2025 04:24 PM    RDW 12.0 02/18/2025 04:24 PM    Platelets 267 02/18/2025 04:24 PM    Segmented % 55 02/18/2025 04:24 PM    Lymphocytes % 32 02/18/2025 04:24 PM    Monocytes % 11 02/18/2025 04:24 PM    Eosinophils Relative 2 02/18/2025 04:24 PM    Basophils Relative 0 02/18/2025 04:24 PM    Immature Grans % 0 02/18/2025 04:24 PM    Absolute Neutrophils 4.29 02/18/2025 04:24 PM     Lab Results   Component Value Date/Time    Potassium 3.7 02/18/2025 04:24 PM    Chloride 105 02/18/2025 04:24  PM    CO2 24 02/18/2025 04:24 PM    BUN 15 02/18/2025 04:24 PM    Creatinine 1.21 02/18/2025 04:24 PM    Glucose, Fasting 83 01/10/2025 06:11 AM    Calcium 9.3 02/18/2025 04:24 PM    AST 18 02/18/2025 04:24 PM    ALT 19 02/18/2025 04:24 PM    Alkaline Phosphatase 62 02/18/2025 04:24 PM    Total Protein 7.2 02/18/2025 04:24 PM    Albumin 4.6 02/18/2025 04:24 PM    Total Bilirubin 1.05 (H) 02/18/2025 04:24 PM    eGFR 78 02/18/2025 04:24 PM     Serum AFP levels:    Component      Latest Ref Rng 12/2/2021 1/17/2024 1/10/2025 3/18/2025   AFP-TUMOR MARKER      0.00 - 9.00 ng/mL 4.5  2.75  3.47  2.91      Serum hCG levels:    Component      Latest Ref Rng 3/18/2025   HCG QUANTITATIVE      0 - 0.6 mIU/mL <0.6      Serum LDH levels:    Component      Latest Ref Rng 3/18/2025   LD (LDH)      140 - 271 U/L 138 (L)

## 2025-04-10 ENCOUNTER — HOSPITAL ENCOUNTER (OUTPATIENT)
Dept: RADIOLOGY | Age: 35
Discharge: HOME | End: 2025-04-10
Payer: COMMERCIAL

## 2025-04-10 LAB — GLUCOSE SERPL-MCNC: 101 MG/DL (ref 65–140)

## 2025-04-10 PROCEDURE — 78815 PET IMAGE W/CT SKULL-THIGH: CPT

## 2025-04-10 PROCEDURE — 82948 REAGENT STRIP/BLOOD GLUCOSE: CPT

## 2025-04-10 PROCEDURE — A9552 F18 FDG: HCPCS

## 2025-04-11 ENCOUNTER — OFFICE VISIT (OUTPATIENT)
Dept: HEMATOLOGY ONCOLOGY | Facility: CLINIC | Age: 35
End: 2025-04-11
Payer: COMMERCIAL

## 2025-04-11 VITALS
TEMPERATURE: 98.6 F | RESPIRATION RATE: 17 BRPM | SYSTOLIC BLOOD PRESSURE: 138 MMHG | HEIGHT: 72 IN | OXYGEN SATURATION: 98 % | HEART RATE: 89 BPM | BODY MASS INDEX: 21.4 KG/M2 | DIASTOLIC BLOOD PRESSURE: 86 MMHG | WEIGHT: 158 LBS

## 2025-04-11 DIAGNOSIS — R91.1 NODULE OF LEFT LUNG: ICD-10-CM

## 2025-04-11 DIAGNOSIS — C48.0 GERM CELL TUMOR OF RETROPERITONEUM (HCC): Primary | ICD-10-CM

## 2025-04-11 PROCEDURE — 99213 OFFICE O/P EST LOW 20 MIN: CPT | Performed by: INTERNAL MEDICINE

## 2025-04-11 NOTE — PROGRESS NOTES
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  He received induction cisplatin based combination chemotherapy 4 cycles, followed by a postchemotherapy retroperitoneal lymph node dissection (RPLND) plus left partial nephrectomy. He is unaware of pathology from RPLND. He developed a postoperative left-sided hydrocele. On on 2014 and on 2014 contrast-enhanced CT scan of the chest respectively, showed stable left lower lobe 3 mm pulmonary nodule.  No new nodules were identified at that time.     Currently, Mr. Funez is undergoing surveillance. On 2024 scrotal and testicular ultrasound showed a left hydrocele.  There were no testicular masses. The patient's most recent surveillance cross-sectional imaging studies include a contrast-enhanced CT scan of the abdomen and pelvis on 2024 which did not show recurrent or metastatic disease.  On 2024, contrast-enhanced CT scan of the chest, abdomen and pelvis showed a 0.7 x 0.4 cm groundglass pulmonary nodule in the left upper lobe. The patient's most recent serum germ cell tumor biomarkers on January 10, 2025, including serum AFP and hCG were within normal limits.Differential diagnosis for the patient's sub-centimeter left lung upper lobe nodule include an inflammatory lung infiltrate, scar tissue, or late recurrence of germ cell tumor, specifically a small left lung upper lobe teratoma. On 2025,  CT scan of the chest without contrast showed minimal increase of the left upper lobe groundglass nodule from the prior study in 2024. On 2025,  "germ cell tumor markers including serum AFP, hCG and LDH were within normal limits.      Interim Assessment: On April 10, 2025, FDG PET/CT scan showed no FDG avid soft tissue lesions. There was an anterior mediastinal soft tissue without significant FDG uptake, most likely residual thymic tissue. A stable 9 mm left upper lung groundglass nodule was identified. Mr. Funez remains asymptomatic. Based on these findings the main diagnostic possibility is a slow growing left lung upper lobe teratoma vs a benign lung infiltrate.  After treatment of nonseminomatous germ cell tumors, residual lesions measuring 1 cm in diameter or larger, in the presence of normal or normalized germ cell tumor serum markers have an indication for surgical resection.  Currently, the patient's left lung upper lobe nodule measures 9 mm. Therefore I recommend to continue close observation.  At the moment, Mr. Funez does not have indication for surgical resection of left lung upper lobe nodule. He will have a follow up contrast CT scan of the chest in early August 25.      Plan:  Continue observation, including periodic history and physical exam, non contrast CT scan of the chest, and gem cell tumor serum markers.  Germ cell tumor serum markers and CT scan of the chest in early August 2025   Return to medical oncology clinic for history and physical exam and to review results of CT scan of the chest in mid-August 2025       Nodule of left lung  See above assessment plan under \"germ cell tumor of the retroperitoneum\"       Mr. Funez understands and agrees with my management recommendations and plan of care. I answered questions to his satisfaction.          History of Present Illness   No chief complaint on file.  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  He received induction cisplatin based combination chemotherapy 4 cycles, followed by a " postchemotherapy retroperitoneal lymph node dissection (RPLND) plus left partial nephrectomy. He is unaware of pathology from RPLND. He developed a postoperative left-sided hydrocele. On on July 23, 2014 and on November 14, 2014 contrast-enhanced CT scan of the chest respectively, showed stable left lower lobe 3 mm pulmonary nodule.  No new nodules were identified at that time.     Currently, Mr. Funez is undergoing surveillance. On January 14, 2024 scrotal and testicular ultrasound showed a left hydrocele.  There were no testicular masses. The patient's most recent surveillance cross-sectional imaging studies include a contrast-enhanced CT scan of the abdomen and pelvis on November 11 2024 which did not show recurrent or metastatic disease.  On December 11, 2024, contrast-enhanced CT scan of the chest, abdomen and pelvis showed a 0.7 x 0.4 cm groundglass pulmonary nodule in the left upper lobe. The patient's most recent serum germ cell tumor biomarkers on January 10, 2025, including serum AFP and hCG were within normal limits. On March 11, 2025,  CT scan of the chest without contrast showed minimal increase of the left upper lobe groundglass nodule from the prior study in December 2024. On March 18, 2025, germ cell tumor markers including serum AFP, hCG and LDH were within normal limits.      Interim History: On April 10, 2025, FDG PET/CT scan showed no FDG avid soft tissue lesions. There was an anterior mediastinal soft tissue without significant FDG uptake, most likely residual thymic tissue. A stable 9 mm left upper lung groundglass nodule was identified. Mr. Funez remains asymptomatic.  He denies new systemic, cardiorespiratory, genitourinary, gastrointestinal, muscle skeletal, dermatological, or neurologic symptoms.  He does not have pain.    Pertinent Medical History     Past Medical History:   Diagnosis Date    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HCC)     Renal disorder           Review of Systems   Constitutional:  Negative  for activity change, appetite change, chills, diaphoresis, fatigue, fever and unexpected weight change.   HENT:  Negative for congestion, dental problem, ear discharge, ear pain, facial swelling, hearing loss, mouth sores, nosebleeds, postnasal drip, rhinorrhea, sinus pain, sneezing, sore throat, tinnitus, trouble swallowing and voice change.    Eyes:  Negative for photophobia, pain, discharge, redness, itching and visual disturbance.   Respiratory:  Negative for apnea, cough, choking, chest tightness, shortness of breath, wheezing and stridor.    Cardiovascular:  Negative for chest pain, palpitations and leg swelling.   Gastrointestinal:  Negative for abdominal distention, abdominal pain, anal bleeding, blood in stool, constipation, diarrhea, nausea, rectal pain and vomiting.   Endocrine: Negative for cold intolerance and heat intolerance.   Genitourinary:  Negative for decreased urine volume, difficulty urinating, dysuria, enuresis, flank pain, frequency, hematuria and urgency.   Musculoskeletal:  Negative for arthralgias, back pain, gait problem, joint swelling, myalgias, neck pain and neck stiffness.   Skin:  Negative for color change, pallor, rash and wound.   Neurological:  Negative for dizziness, tremors, seizures, syncope, facial asymmetry, speech difficulty, weakness, light-headedness, numbness and headaches.   Hematological:  Negative for adenopathy. Does not bruise/bleed easily.   Psychiatric/Behavioral:  Negative for behavioral problems, confusion, dysphoric mood and sleep disturbance. The patient is not nervous/anxious.            Objective   There were no vitals taken for this visit.    Pain Screening:     ECOG   0  Physical Exam  Constitutional:       Comments: Male patient well-developed, well-nourished, without acute respiratory distress   HENT:      Head: Normocephalic and atraumatic.      Nose: Nose normal.      Mouth/Throat:      Mouth: Mucous membranes are moist.      Pharynx: Oropharynx is  clear.   Eyes:      Extraocular Movements: Extraocular movements intact.      Conjunctiva/sclera: Conjunctivae normal.      Pupils: Pupils are equal, round, and reactive to light.      Comments: No scleral icterus   Neck:      Comments: No cervical, supraclavicular, axillary, epitrochlear, or inguinal lymphadenopathy.   Cardiovascular:      Rate and Rhythm: Normal rate and regular rhythm.      Pulses: Normal pulses.      Heart sounds: Normal heart sounds.   Pulmonary:      Effort: Pulmonary effort is normal.      Breath sounds: Normal breath sounds.   Abdominal:      General: Bowel sounds are normal.      Palpations: Abdomen is soft.      Comments: Abdomen soft, nontender, nonpainful.  No hepatomegaly.  No splenomegaly.  No rebound tenderness.  No lateral or shifting dullness.  Bowel sounds present, normal.    Musculoskeletal:         General: Normal range of motion.      Cervical back: Normal range of motion and neck supple.   Skin:     General: Skin is warm and dry.      Capillary Refill: Capillary refill takes less than 2 seconds.   Neurological:      General: No focal deficit present.      Mental Status: He is alert and oriented to person, place, and time. Mental status is at baseline.   Psychiatric:         Mood and Affect: Mood normal.         Behavior: Behavior normal.         Thought Content: Thought content normal.         Judgment: Judgment normal.         Labs: I have reviewed the following labs:  Lab Results   Component Value Date/Time    WBC 7.77 02/18/2025 04:24 PM    RBC 5.19 02/18/2025 04:24 PM    Hemoglobin 15.7 02/18/2025 04:24 PM    Hematocrit 46.3 02/18/2025 04:24 PM    MCV 89 02/18/2025 04:24 PM    MCH 30.3 02/18/2025 04:24 PM    RDW 12.0 02/18/2025 04:24 PM    Platelets 267 02/18/2025 04:24 PM    Segmented % 55 02/18/2025 04:24 PM    Lymphocytes % 32 02/18/2025 04:24 PM    Monocytes % 11 02/18/2025 04:24 PM    Eosinophils Relative 2 02/18/2025 04:24 PM    Basophils Relative 0 02/18/2025 04:24  PM    Immature Grans % 0 02/18/2025 04:24 PM    Absolute Neutrophils 4.29 02/18/2025 04:24 PM     Lab Results   Component Value Date/Time    Potassium 3.7 02/18/2025 04:24 PM    Chloride 105 02/18/2025 04:24 PM    CO2 24 02/18/2025 04:24 PM    BUN 15 02/18/2025 04:24 PM    Creatinine 1.21 02/18/2025 04:24 PM    Glucose, Fasting 83 01/10/2025 06:11 AM    Calcium 9.3 02/18/2025 04:24 PM    AST 18 02/18/2025 04:24 PM    ALT 19 02/18/2025 04:24 PM    Alkaline Phosphatase 62 02/18/2025 04:24 PM    Total Protein 7.2 02/18/2025 04:24 PM    Albumin 4.6 02/18/2025 04:24 PM    Total Bilirubin 1.05 (H) 02/18/2025 04:24 PM    eGFR 78 02/18/2025 04:24 PM     Germ cell tumor serum markers:    Component      Latest Ref Rng 12/2/2021 1/17/2024 1/10/2025 3/18/2025   AFP-TUMOR MARKER      0.00 - 9.00 ng/mL 4.5  2.75  3.47  2.91        Component      Latest Ref Rng 3/18/2025   HCG QUANTITATIVE      0 - 0.6 mIU/mL <0.6        Component      Latest Ref Rng 3/18/2025   LD (LDH)      140 - 271 U/L 138 (L)

## 2025-04-11 NOTE — ASSESSMENT & PLAN NOTE
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  He received induction cisplatin based combination chemotherapy 4 cycles, followed by a postchemotherapy retroperitoneal lymph node dissection (RPLND) plus left partial nephrectomy. He is unaware of pathology from RPLND. He developed a postoperative left-sided hydrocele. On on July 23, 2014 and on November 14, 2014 contrast-enhanced CT scan of the chest respectively, showed stable left lower lobe 3 mm pulmonary nodule.  No new nodules were identified at that time.     Currently, Mr. Funez is undergoing surveillance. On January 14, 2024 scrotal and testicular ultrasound showed a left hydrocele.  There were no testicular masses. The patient's most recent surveillance cross-sectional imaging studies include a contrast-enhanced CT scan of the abdomen and pelvis on November 11 2024 which did not show recurrent or metastatic disease.  On December 11, 2024, contrast-enhanced CT scan of the chest, abdomen and pelvis showed a 0.7 x 0.4 cm groundglass pulmonary nodule in the left upper lobe. The patient's most recent serum germ cell tumor biomarkers on January 10, 2025, including serum AFP and hCG were within normal limits.Differential diagnosis for the patient's sub-centimeter left lung upper lobe nodule include an inflammatory lung infiltrate, scar tissue, or late recurrence of germ cell tumor, specifically a small left lung upper lobe teratoma. On March 11, 2025,  CT scan of the chest without contrast showed minimal increase of the left upper lobe groundglass nodule from the prior study in December 2024. On March 18, 2025, germ cell tumor markers including serum AFP, hCG and LDH were within normal limits.      Interim Assessment: On April 10, 2025, FDG PET/CT scan showed no FDG avid soft tissue lesions. There was an anterior mediastinal soft tissue without significant FDG uptake, most  likely residual thymic tissue. A stable 9 mm left upper lung groundglass nodule was identified. Mr. Funez remains asymptomatic. Based on these findings the main diagnostic possibility is a slow growing left lung upper lobe teratoma vs a benign lung infiltrate.  After treatment of nonseminomatous germ cell tumors, residual lesions measuring 1 cm in diameter or larger, in the presence of normal or normalized germ cell tumor serum markers have an indication for surgical resection.  Currently, the patient's left lung upper lobe nodule measures 9 mm. Therefore I recommend to continue close observation.  At the moment, Mr. Funez does not have indication for surgical resection of left lung upper lobe nodule. He will have a follow up contrast CT scan of the chest in early August 25.      Plan:  Continue observation, including periodic history and physical exam, non contrast CT scan of the chest, and gem cell tumor serum markers.  Germ cell tumor serum markers and CT scan of the chest in early August 2025   Return to medical oncology clinic for history and physical exam and to review results of CT scan of the chest in mid-August 2025

## 2025-05-05 ENCOUNTER — OFFICE VISIT (OUTPATIENT)
Dept: CARDIOLOGY CLINIC | Facility: CLINIC | Age: 35
End: 2025-05-05
Payer: COMMERCIAL

## 2025-05-05 ENCOUNTER — PREP FOR PROCEDURE (OUTPATIENT)
Dept: CARDIOLOGY CLINIC | Facility: CLINIC | Age: 35
End: 2025-05-05

## 2025-05-05 ENCOUNTER — TELEPHONE (OUTPATIENT)
Dept: CARDIOLOGY CLINIC | Facility: CLINIC | Age: 35
End: 2025-05-05

## 2025-05-05 VITALS
WEIGHT: 161 LBS | BODY MASS INDEX: 21.81 KG/M2 | SYSTOLIC BLOOD PRESSURE: 110 MMHG | DIASTOLIC BLOOD PRESSURE: 78 MMHG | HEART RATE: 77 BPM | HEIGHT: 72 IN

## 2025-05-05 DIAGNOSIS — I45.6 WPW (WOLFF-PARKINSON-WHITE SYNDROME): Primary | ICD-10-CM

## 2025-05-05 LAB
ATRIAL RATE: 77 BPM
P AXIS: 13 DEGREES
PR INTERVAL: 116 MS
QRS AXIS: 100 DEGREES
QRSD INTERVAL: 156 MS
QT INTERVAL: 414 MS
QTC INTERVAL: 469 MS
T WAVE AXIS: 62 DEGREES
VENTRICULAR RATE: 77 BPM

## 2025-05-05 PROCEDURE — 99245 OFF/OP CONSLTJ NEW/EST HI 55: CPT | Performed by: INTERNAL MEDICINE

## 2025-05-05 PROCEDURE — 93000 ELECTROCARDIOGRAM COMPLETE: CPT | Performed by: INTERNAL MEDICINE

## 2025-05-05 NOTE — PROGRESS NOTES
HEART AND VASCULAR  CARDIAC ELECTROPHYSIOLOGY   HEART RHYTHM CENTER  [facility]    Outpatient New Consult    Today's Date: 05/05/25        Patient name: Igor Funez  YOB: 1990  Sex: male         Chief Complaint: WPW      ASSESSMENT:  Problem List Items Addressed This Visit    None  Visit Diagnoses         WPW (Daija-Parkinson-White syndrome)    -  Primary    Relevant Orders    POCT ECG            33 yo male  1) WPW left lateral pathway fully preexcited. IN ER for dizzyness, palpitations (watch said HR 30s). Was NSR when he go there. DId 2 week Zio showing preexictation throughout, some ectopy but no sustained arrhythmias.   No syncope.  EKG 2022 showed same.     2) H/o Germ cell tumor teenager needed echo and surgery. Remission  Has benign nodule on a CT      EKG shows preexctiation with neg lead I and pos V1      PLAN:    Recommend EPS and ablation of WPW.  Cannot tell if high risk pathway since was preexcited throughout his ZIo monitor. We did discuss option of stress testing first but he has recurrent symptoms suspicious of SVT so worth just ablating at this point.  Risks and benefits discussed, estimate 90% chance of success and <1% chance of serious complication.     Check TTE    Orders Placed This Encounter   Procedures    POCT ECG     There are no discontinued medications.      .............................................................................................    HPI/Subjective:       33 yo male  1) WPW left lateral pathway fully preexcited. IN ER for dizzyness, palpitations (watch said HR 30s). Was NSR when he go there. DId 2 week Zio showing preexictation throughout, some ectopy but no sustained arrhythmias.   No syncope.  EKG 2022 showed same.     2) H/o Germ cell tumor teenager needed echo and surgery. Remission  Has benign nodule on a CT    Please note HPI is listed by problem with with update following it, it is copied again in the assessment above and  reflects medical decision making as well.           Past Medical History:   Diagnosis Date    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HCC)     Renal disorder        No Known Allergies  I reviewed the Home Medication list and Allergies in the chart.   Scheduled Meds:  No current outpatient medications on file.     No current facility-administered medications for this visit.     PRN Meds:.        Family History   Problem Relation Age of Onset    No Known Problems Father     No Known Problems Mother        Social History     Socioeconomic History    Marital status: /Civil Union     Spouse name: Not on file    Number of children: Not on file    Years of education: Not on file    Highest education level: Not on file   Occupational History    Not on file   Tobacco Use    Smoking status: Never    Smokeless tobacco: Current     Types: Chew   Vaping Use    Vaping status: Never Used   Substance and Sexual Activity    Alcohol use: Not Currently    Drug use: No    Sexual activity: Yes     Partners: Female   Other Topics Concern    Not on file   Social History Narrative    Not on file     Social Drivers of Health     Financial Resource Strain: Not on file   Food Insecurity: Not on file   Transportation Needs: Not on file   Physical Activity: Not on file   Stress: Not on file   Social Connections: Not on file   Intimate Partner Violence: Not on file   Housing Stability: Not on file         OBJECTIVE:    /78 (BP Location: Right arm, Patient Position: Sitting, Cuff Size: Standard)   Pulse 77   Ht 6' (1.829 m)   Wt 73 kg (161 lb)   BMI 21.84 kg/m²   Vitals:    05/05/25 1511   Weight: 73 kg (161 lb)     GEN: No acute distress, Alert and oriented, well appearing  HEENT:External ears normal, oral pharynx clear, mucous membranes moist  EYES: Pupils equal, sclera anicteric, midline, normal conjuctiva  NECK: No JVD, supple, no obvious masses or thryomegaly or goiter  CARDIOVASCULAR:  RRR, No murmur, rub, gallops S1,S2  LUNGS: Clear To  "auscultation bilaterally, normal effort, no rales, rhonchi, crackles   ABDOMEN:  nondistended,  without obvious organomegaly or ascites  EXTREMITIES/VASCULAR:  No edema. warm an well perfused.  PSYCH: Normal Affect,  linear speech pattern without evidence of psychosis.   NEURO: Grossly intact, moving all extremiteis equal, face symmetric, alert and responsive, no obvious focal defecits   GAIT:  Ambulates normally without difficulty  HEME: No bleeding, bruising, petechia, purpura   SKIN: No significant rashes on visibile skin, warm, no diaphoresis or pallor.     Lab Results:       LABS:      Chemistry        Component Value Date/Time     (L) 01/15/2015 1632    K 3.7 02/18/2025 1624    K 3.7 01/15/2015 1632     02/18/2025 1624    CL 99 (L) 01/15/2015 1632    CO2 24 02/18/2025 1624    CO2 31 01/15/2015 1632    BUN 15 02/18/2025 1624    BUN 11 01/15/2015 1632    CREATININE 1.21 02/18/2025 1624    CREATININE 0.93 01/15/2015 1632        Component Value Date/Time    CALCIUM 9.3 02/18/2025 1624    CALCIUM 9.6 01/15/2015 1632    ALKPHOS 62 02/18/2025 1624    ALKPHOS 77 05/20/2022 0830    AST 18 02/18/2025 1624    AST 74 (H) 05/20/2022 0830    ALT 19 02/18/2025 1624     (H) 05/20/2022 0830    BILITOT 1.1 (H) 01/15/2015 1632            No results found for: \"CHOL\"  Lab Results   Component Value Date    HDL 52 01/10/2025     Lab Results   Component Value Date    LDLCALC 142 (H) 01/10/2025     Lab Results   Component Value Date    TRIG 98 01/10/2025     No results found for: \"CHOLHDL\"    IMAGING: NM PET CT skull base to mid thigh  Result Date: 4/10/2025  Narrative: PET/CT SCAN INDICATION: C48.0: Malignant neoplasm of retroperitoneum MODIFIER: PS COMPARISON: Chest CT 3/11/2025 and priors CELL TYPE: Germ cell tumor TECHNIQUE:   8.3 mCi F-18-FDG administered IV. Multiplanar attenuation corrected and non attenuation corrected PET images are available for interpretation, and contiguous, low dose, axial CT sections " were obtained from the skull base through the femurs. Intravenous contrast material was not utilized. This examination, like all CT scans performed in the [facility], was performed utilizing techniques to minimize radiation dose exposure, including the use of iterative reconstruction and automated exposure control. Fasting serum glucose: 101 mg/dl FINDINGS: VISUALIZED BRAIN: No acute abnormalities are seen. HEAD/NECK: There is a physiologic distribution of FDG. No FDG avid cervical adenopathy is seen. CT images: Unremarkable. CHEST: No FDG avid soft tissue lesions are seen. Anterior mediastinal soft tissue without significant FDG uptake. CT images: Stable 9 mm left upper lung groundglass nodule. ABDOMEN: No FDG avid soft tissue lesions are seen. CT images: Unremarkable. PELVIS: No FDG avid soft tissue lesions are seen. CT images: Large left hydrocele. OSSEOUS STRUCTURES: No FDG avid lesions are seen. CT images: No significant findings. Right clavicular fixation hardware.     Impression: 1.  Stable 9 mm left upper lung groundglass nodule. 2.  No hypermetabolic lesions that are concerning for malignancy/metastases. Workstation performed: [id number]        Cardiac testing:   No results found for this or any previous visit.    No results found for this or any previous visit.    No results found for this or any previous visit.    No results found for this or any previous visit.          I reviewed and interpreted the following LABS/EKG/TELE/IMAGING and below is summary of my interpretation (if data available):        Current EKG and Rhythm Strip:see above    Past EKGs and RHYTHM strip: 2022 EKG WPW left lateral pathway.

## 2025-05-05 NOTE — TELEPHONE ENCOUNTER
"Patient scheduled for SVT Ablation on 10/20/25 at [facility] with Dr. Shepard.      Instructions sent to patient through Peerf[employer] and in person in office.      Patient aware of all general instructions.    Medication holds:   N/A    Blood Thinners:  N/A    Labs to be done on 10/6/25:  CMP / CBC (FASTING 8 HOURS)      Thank you,  Katerin \"Rica\" Albert    "

## 2025-05-05 NOTE — LETTER
2025               CARDIAC ABLATION INSTRUCTIONS     Igor Funez   : 1990  MRN: [id number]  [address] [facility] [place] [address]    Procedure: SVT ABLATION     Procedure Date: 10/20/25    Location: [facility]  Address: [address] [place] [address], [place] [address]        Labs to be done on 10/6/25: CMP /  CBC (FASTING 8 HOURS)    BLOOD THINNER INSTRUCTIONS (Coumadin / Warfarin / Pradaxa / Eliquis / Xarelto): N/A    Medication Hold: N/A    Arrival Time: The Hospital will contact you the day prior to your procedure, usually between 4PM - 6PM to instruct you on the time and place to report. If you do not hear from a [facility]  by 5PM the evening prior to your procedure, please contact the [place] at [phone].    DO NOT eat or drink ANYTHING after midnight the night prior to your procedure including gum & candy.     You may have a SIP of WATER with your morning medications, UNLESS ADVISE OTHERWISE.     Please notify us if you have been prescribed a NEW MEDICATION prior to your procedure or admitted to the hospital within 30 days.      If you develop a cold, sore throat, fever or any other illness prior to your procedure date, notify your surgeon immediately.    Arrange for a responsible adult to drive you to and from the hospital.    DO NOT stop taking Plavix or Aspirin unless advised otherwise.     If you are currently taking Fish Oil, Krill oil and/or Vitamin E please DO NOT TAKE FOR A WEEK PRIOR TO PROCEDURE.           If you are diabetic, DO NOT take any of your diabetic pills the morning of your procedure. Oral diabetic medications may include Glucophage, Prandin, Glyburide, Micronase, Avandia, Glucovance, Precose, Glynase, and Starlix.     Bring a list of daily medications, vitamins, minerals, herbals and nutritional supplements you take. Please include dosages and the times you take them each day.     If you are packing an overnight bag, pack minimal clothing,  "you will be given hospital sleepwear.   DO NOT bring money, valuables or jewelry. The wedding band is ok.     If you use CPAP machine, bring it to the hospital.      Bring your Photo ID and Insurance cards with you.       FAILURE TO FOLLOW ANY OF THESE INSTRUCTIONS COULD RESULT IN THE CANCELLATION OF YOUR PROCEDURE      Please call [phone] if you do not hear from the  by 5:00PM the night before your procedure.    All patients enter through ENTRANCE B. [employer] Parking is available free of charge or park on Parking Deck B.        Thank you,   Katerin \"Rica\" Albert    [facility]   [address] [place] [address]  Teams: [phone]             "

## 2025-08-07 ENCOUNTER — HOSPITAL ENCOUNTER (OUTPATIENT)
Dept: CT IMAGING | Facility: HOSPITAL | Age: 35
End: 2025-08-07
Payer: COMMERCIAL

## 2025-08-11 ENCOUNTER — APPOINTMENT (OUTPATIENT)
Dept: LAB | Facility: MEDICAL CENTER | Age: 35
End: 2025-08-11
Payer: COMMERCIAL

## 2025-08-13 ENCOUNTER — OFFICE VISIT (OUTPATIENT)
Dept: HEMATOLOGY ONCOLOGY | Facility: CLINIC | Age: 35
End: 2025-08-13
Payer: COMMERCIAL